# Patient Record
Sex: MALE | NOT HISPANIC OR LATINO | Employment: OTHER | URBAN - NONMETROPOLITAN AREA
[De-identification: names, ages, dates, MRNs, and addresses within clinical notes are randomized per-mention and may not be internally consistent; named-entity substitution may affect disease eponyms.]

---

## 2017-02-01 ENCOUNTER — HOSPITAL ENCOUNTER (INPATIENT)
Facility: HOSPITAL | Age: 67
LOS: 2 days | Discharge: HOME | End: 2017-02-03
Attending: INTERNAL MEDICINE | Admitting: INTERNAL MEDICINE

## 2017-02-01 PROBLEM — I21.9 AMI (ACUTE MYOCARDIAL INFARCTION) (HCC): Status: ACTIVE | Noted: 2017-02-01

## 2017-02-01 LAB
ARTICHOKE IGE QN: 186 MG/DL (ref 1–129)
CHOLEST SERPL-MCNC: 262 MG/DL (ref 0–199)
CK MB SERPL-CCNC: 48.2 NG/ML (ref 0–5)
CK MB SERPL-CCNC: 59.7 NG/ML (ref 0–5)
CK SERPL-CCNC: 1355 U/L (ref 55–170)
CK SERPL-CCNC: 2771 U/L (ref 55–170)
GLUCOSE BLDC GLUCOMTR-MCNC: 180 MG/DL (ref 70–130)
HBA1C MFR BLD: 7.61 % (ref 4–5.6)
HDLC SERPL-MCNC: 35 MG/DL (ref 60–200)
LDLC/HDLC SERPL: 4.72 {RATIO} (ref 0–3.55)
TRIGL SERPL-MCNC: 309 MG/DL (ref 20–199)
TROPONIN I SERPL-MCNC: 135 NG/ML
TROPONIN I SERPL-MCNC: 53.2 NG/ML
TROPONIN I SERPL-MCNC: 83.1 NG/ML

## 2017-02-01 PROCEDURE — C1894 INTRO/SHEATH, NON-LASER: HCPCS | Performed by: INTERNAL MEDICINE

## 2017-02-01 PROCEDURE — 25010000002 PHENYLEPHRINE PER 1 ML

## 2017-02-01 PROCEDURE — C1874 STENT, COATED/COV W/DEL SYS: HCPCS | Performed by: INTERNAL MEDICINE

## 2017-02-01 PROCEDURE — C1760 CLOSURE DEV, VASC: HCPCS | Performed by: INTERNAL MEDICINE

## 2017-02-01 PROCEDURE — C9601 PERC DRUG-EL COR STENT BRAN: HCPCS | Performed by: INTERNAL MEDICINE

## 2017-02-01 PROCEDURE — C1769 GUIDE WIRE: HCPCS | Performed by: INTERNAL MEDICINE

## 2017-02-01 PROCEDURE — 25010000002 BIVALIRUDIN PER 1 MG: Performed by: INTERNAL MEDICINE

## 2017-02-01 PROCEDURE — 25010000002 EPINEPHRINE 0.1 MG/ML SOLUTION PREFILLED SYRINGE

## 2017-02-01 PROCEDURE — 93005 ELECTROCARDIOGRAM TRACING: CPT | Performed by: INTERNAL MEDICINE

## 2017-02-01 PROCEDURE — 25010000002 MIDAZOLAM PER 1 MG: Performed by: INTERNAL MEDICINE

## 2017-02-01 PROCEDURE — B41B1ZZ FLUOROSCOPY OF OTHER INTRA-ABDOMINAL ARTERIES USING LOW OSMOLAR CONTRAST: ICD-10-PCS | Performed by: INTERNAL MEDICINE

## 2017-02-01 PROCEDURE — C1725 CATH, TRANSLUMIN NON-LASER: HCPCS | Performed by: INTERNAL MEDICINE

## 2017-02-01 PROCEDURE — 93458 L HRT ARTERY/VENTRICLE ANGIO: CPT | Performed by: INTERNAL MEDICINE

## 2017-02-01 PROCEDURE — 83036 HEMOGLOBIN GLYCOSYLATED A1C: CPT | Performed by: INTERNAL MEDICINE

## 2017-02-01 PROCEDURE — 84484 ASSAY OF TROPONIN QUANT: CPT | Performed by: INTERNAL MEDICINE

## 2017-02-01 PROCEDURE — 93005 ELECTROCARDIOGRAM TRACING: CPT

## 2017-02-01 PROCEDURE — C1887 CATHETER, GUIDING: HCPCS | Performed by: INTERNAL MEDICINE

## 2017-02-01 PROCEDURE — B2111ZZ FLUOROSCOPY OF MULTIPLE CORONARY ARTERIES USING LOW OSMOLAR CONTRAST: ICD-10-PCS | Performed by: INTERNAL MEDICINE

## 2017-02-01 PROCEDURE — 63710000001 INSULIN ASPART PER 5 UNITS: Performed by: INTERNAL MEDICINE

## 2017-02-01 PROCEDURE — C9606 PERC D-E COR REVASC W AMI S: HCPCS | Performed by: INTERNAL MEDICINE

## 2017-02-01 PROCEDURE — B41F1ZZ FLUOROSCOPY OF RIGHT LOWER EXTREMITY ARTERIES USING LOW OSMOLAR CONTRAST: ICD-10-PCS | Performed by: INTERNAL MEDICINE

## 2017-02-01 PROCEDURE — 4A023N7 MEASUREMENT OF CARDIAC SAMPLING AND PRESSURE, LEFT HEART, PERCUTANEOUS APPROACH: ICD-10-PCS | Performed by: INTERNAL MEDICINE

## 2017-02-01 PROCEDURE — 82962 GLUCOSE BLOOD TEST: CPT

## 2017-02-01 PROCEDURE — 25010000002 BIVALIRUDIN PER 1 MG

## 2017-02-01 PROCEDURE — 94799 UNLISTED PULMONARY SVC/PX: CPT

## 2017-02-01 PROCEDURE — B2151ZZ FLUOROSCOPY OF LEFT HEART USING LOW OSMOLAR CONTRAST: ICD-10-PCS | Performed by: INTERNAL MEDICINE

## 2017-02-01 PROCEDURE — 82550 ASSAY OF CK (CPK): CPT | Performed by: INTERNAL MEDICINE

## 2017-02-01 PROCEDURE — 027135Z DILATION OF CORONARY ARTERY, TWO ARTERIES WITH TWO DRUG-ELUTING INTRALUMINAL DEVICES, PERCUTANEOUS APPROACH: ICD-10-PCS | Performed by: INTERNAL MEDICINE

## 2017-02-01 PROCEDURE — 25010000002 HYDROMORPHONE PER 4 MG

## 2017-02-01 PROCEDURE — 3E073PZ INTRODUCTION OF PLATELET INHIBITOR INTO CORONARY ARTERY, PERCUTANEOUS APPROACH: ICD-10-PCS | Performed by: INTERNAL MEDICINE

## 2017-02-01 PROCEDURE — 80061 LIPID PANEL: CPT | Performed by: INTERNAL MEDICINE

## 2017-02-01 PROCEDURE — 0 IOPAMIDOL PER 1 ML: Performed by: INTERNAL MEDICINE

## 2017-02-01 PROCEDURE — 82553 CREATINE MB FRACTION: CPT | Performed by: INTERNAL MEDICINE

## 2017-02-01 PROCEDURE — 25010000002 HYDROMORPHONE PER 4 MG: Performed by: INTERNAL MEDICINE

## 2017-02-01 PROCEDURE — 93010 ELECTROCARDIOGRAM REPORT: CPT | Performed by: INTERNAL MEDICINE

## 2017-02-01 DEVICE — XIENCE ALPINE EVEROLIMUS ELUTING CORONARY STENT SYSTEM 3.00 MM X 12 MM / OVER-THE-WIRE
Type: IMPLANTABLE DEVICE | Status: FUNCTIONAL
Brand: XIENCE ALPINE

## 2017-02-01 DEVICE — XIENCE ALPINE EVEROLIMUS ELUTING CORONARY STENT SYSTEM 2.50 MM X 23 MM / OVER-THE-WIRE
Type: IMPLANTABLE DEVICE | Status: FUNCTIONAL
Brand: XIENCE ALPINE

## 2017-02-01 RX ORDER — CEFDINIR 300 MG/1
300 CAPSULE ORAL EVERY 12 HOURS
Status: DISCONTINUED | OUTPATIENT
Start: 2017-02-01 | End: 2017-02-03 | Stop reason: HOSPADM

## 2017-02-01 RX ORDER — TEMAZEPAM 7.5 MG/1
7.5 CAPSULE ORAL NIGHTLY PRN
Status: DISCONTINUED | OUTPATIENT
Start: 2017-02-01 | End: 2017-02-03 | Stop reason: HOSPADM

## 2017-02-01 RX ORDER — HYDROCHLOROTHIAZIDE 12.5 MG/1
12.5 CAPSULE, GELATIN COATED ORAL DAILY
Status: DISCONTINUED | OUTPATIENT
Start: 2017-02-01 | End: 2017-02-03 | Stop reason: HOSPADM

## 2017-02-01 RX ORDER — HYDROCODONE BITARTRATE AND ACETAMINOPHEN 7.5; 325 MG/1; MG/1
1 TABLET ORAL EVERY 4 HOURS PRN
Status: DISCONTINUED | OUTPATIENT
Start: 2017-02-01 | End: 2017-02-03 | Stop reason: HOSPADM

## 2017-02-01 RX ORDER — DEXTROSE MONOHYDRATE 25 G/50ML
25 INJECTION, SOLUTION INTRAVENOUS
Status: DISCONTINUED | OUTPATIENT
Start: 2017-02-01 | End: 2017-02-03 | Stop reason: HOSPADM

## 2017-02-01 RX ORDER — PREDNISOLONE ACETATE 10 MG/ML
1 SUSPENSION/ DROPS OPHTHALMIC EVERY 12 HOURS SCHEDULED
Status: DISCONTINUED | OUTPATIENT
Start: 2017-02-01 | End: 2017-02-03 | Stop reason: HOSPADM

## 2017-02-01 RX ORDER — DOCUSATE SODIUM 100 MG/1
100 CAPSULE, LIQUID FILLED ORAL 2 TIMES DAILY
Status: DISCONTINUED | OUTPATIENT
Start: 2017-02-01 | End: 2017-02-03 | Stop reason: HOSPADM

## 2017-02-01 RX ORDER — NITROGLYCERIN 20 MG/100ML
5-200 INJECTION INTRAVENOUS
Status: DISCONTINUED | OUTPATIENT
Start: 2017-02-01 | End: 2017-02-02

## 2017-02-01 RX ORDER — ATORVASTATIN CALCIUM 40 MG/1
40 TABLET, FILM COATED ORAL NIGHTLY
Status: DISCONTINUED | OUTPATIENT
Start: 2017-02-01 | End: 2017-02-03 | Stop reason: HOSPADM

## 2017-02-01 RX ORDER — VALSARTAN 160 MG/1
160 TABLET ORAL DAILY
Status: DISCONTINUED | OUTPATIENT
Start: 2017-02-01 | End: 2017-02-03 | Stop reason: HOSPADM

## 2017-02-01 RX ORDER — ATENOLOL 25 MG/1
25 TABLET ORAL 2 TIMES DAILY
Status: DISCONTINUED | OUTPATIENT
Start: 2017-02-01 | End: 2017-02-03 | Stop reason: HOSPADM

## 2017-02-01 RX ORDER — ONDANSETRON 2 MG/ML
4 INJECTION INTRAMUSCULAR; INTRAVENOUS EVERY 6 HOURS PRN
Status: DISCONTINUED | OUTPATIENT
Start: 2017-02-01 | End: 2017-02-03 | Stop reason: HOSPADM

## 2017-02-01 RX ORDER — TAMSULOSIN HYDROCHLORIDE 0.4 MG/1
0.4 CAPSULE ORAL DAILY
Status: DISCONTINUED | OUTPATIENT
Start: 2017-02-01 | End: 2017-02-03 | Stop reason: HOSPADM

## 2017-02-01 RX ORDER — SODIUM CHLORIDE 450 MG/100ML
75 INJECTION, SOLUTION INTRAVENOUS CONTINUOUS
Status: DISCONTINUED | OUTPATIENT
Start: 2017-02-01 | End: 2017-02-02

## 2017-02-01 RX ORDER — ASPIRIN 81 MG/1
81 TABLET, CHEWABLE ORAL DAILY
Status: DISCONTINUED | OUTPATIENT
Start: 2017-02-01 | End: 2017-02-03 | Stop reason: HOSPADM

## 2017-02-01 RX ORDER — GENTAMICIN SULFATE 3 MG/ML
1 SOLUTION/ DROPS OPHTHALMIC 3 TIMES DAILY
Status: DISCONTINUED | OUTPATIENT
Start: 2017-02-01 | End: 2017-02-03 | Stop reason: HOSPADM

## 2017-02-01 RX ORDER — MIDAZOLAM HYDROCHLORIDE 1 MG/ML
INJECTION INTRAMUSCULAR; INTRAVENOUS AS NEEDED
Status: DISCONTINUED | OUTPATIENT
Start: 2017-02-01 | End: 2017-02-01 | Stop reason: HOSPADM

## 2017-02-01 RX ORDER — HYDROCODONE BITARTRATE AND ACETAMINOPHEN 10; 325 MG/1; MG/1
1 TABLET ORAL EVERY 4 HOURS PRN
Status: DISCONTINUED | OUTPATIENT
Start: 2017-02-01 | End: 2017-02-03 | Stop reason: HOSPADM

## 2017-02-01 RX ORDER — NITROGLYCERIN 0.4 MG/1
TABLET SUBLINGUAL AS NEEDED
Status: DISCONTINUED | OUTPATIENT
Start: 2017-02-01 | End: 2017-02-01 | Stop reason: HOSPADM

## 2017-02-01 RX ORDER — SODIUM CHLORIDE 0.9 % (FLUSH) 0.9 %
1-10 SYRINGE (ML) INJECTION AS NEEDED
Status: DISCONTINUED | OUTPATIENT
Start: 2017-02-01 | End: 2017-02-03 | Stop reason: HOSPADM

## 2017-02-01 RX ORDER — ONDANSETRON 4 MG/1
4 TABLET, FILM COATED ORAL EVERY 6 HOURS PRN
Status: DISCONTINUED | OUTPATIENT
Start: 2017-02-01 | End: 2017-02-03 | Stop reason: HOSPADM

## 2017-02-01 RX ORDER — LIDOCAINE HYDROCHLORIDE 20 MG/ML
INJECTION, SOLUTION INFILTRATION; PERINEURAL AS NEEDED
Status: DISCONTINUED | OUTPATIENT
Start: 2017-02-01 | End: 2017-02-01 | Stop reason: HOSPADM

## 2017-02-01 RX ORDER — NICOTINE POLACRILEX 4 MG
15 LOZENGE BUCCAL
Status: DISCONTINUED | OUTPATIENT
Start: 2017-02-01 | End: 2017-02-03 | Stop reason: HOSPADM

## 2017-02-01 RX ORDER — ACETAMINOPHEN 325 MG/1
650 TABLET ORAL EVERY 4 HOURS PRN
Status: DISCONTINUED | OUTPATIENT
Start: 2017-02-01 | End: 2017-02-03 | Stop reason: HOSPADM

## 2017-02-01 RX ORDER — ONDANSETRON 4 MG/1
4 TABLET, ORALLY DISINTEGRATING ORAL EVERY 6 HOURS PRN
Status: DISCONTINUED | OUTPATIENT
Start: 2017-02-01 | End: 2017-02-03 | Stop reason: HOSPADM

## 2017-02-01 RX ADMIN — NITROGLYCERIN 50 MCG/MIN: 20 INJECTION INTRAVENOUS at 20:46

## 2017-02-01 RX ADMIN — VALSARTAN 160 MG: 160 TABLET, FILM COATED ORAL at 12:41

## 2017-02-01 RX ADMIN — HYDROMORPHONE HYDROCHLORIDE 0.2 MG: 1 INJECTION, SOLUTION INTRAMUSCULAR; INTRAVENOUS; SUBCUTANEOUS at 16:10

## 2017-02-01 RX ADMIN — CEFDINIR 300 MG: 300 CAPSULE ORAL at 21:00

## 2017-02-01 RX ADMIN — ATORVASTATIN CALCIUM 40 MG: 40 TABLET, FILM COATED ORAL at 20:59

## 2017-02-01 RX ADMIN — DOCUSATE SODIUM 100 MG: 100 CAPSULE, LIQUID FILLED ORAL at 17:28

## 2017-02-01 RX ADMIN — TEMAZEPAM 7.5 MG: 7.5 CAPSULE ORAL at 23:50

## 2017-02-01 RX ADMIN — DOCUSATE SODIUM 100 MG: 100 CAPSULE, LIQUID FILLED ORAL at 11:06

## 2017-02-01 RX ADMIN — HYDROMORPHONE HYDROCHLORIDE 0.2 MG: 1 INJECTION, SOLUTION INTRAMUSCULAR; INTRAVENOUS; SUBCUTANEOUS at 12:20

## 2017-02-01 RX ADMIN — ATENOLOL 25 MG: 25 TABLET ORAL at 17:28

## 2017-02-01 RX ADMIN — SODIUM CHLORIDE 75 ML/HR: 4.5 INJECTION, SOLUTION INTRAVENOUS at 23:11

## 2017-02-01 RX ADMIN — SODIUM CHLORIDE 75 ML/HR: 4.5 INJECTION, SOLUTION INTRAVENOUS at 11:08

## 2017-02-01 RX ADMIN — SITAGLIPTIN 100 MG: 100 TABLET, FILM COATED ORAL at 12:41

## 2017-02-01 RX ADMIN — CEFDINIR 300 MG: 300 CAPSULE ORAL at 12:32

## 2017-02-01 RX ADMIN — TICAGRELOR 90 MG: 90 TABLET ORAL at 17:28

## 2017-02-01 RX ADMIN — HYDROCODONE BITARTRATE AND ACETAMINOPHEN 1 TABLET: 7.5; 325 TABLET ORAL at 14:49

## 2017-02-01 RX ADMIN — HYDROCODONE BITARTRATE AND ACETAMINOPHEN 1 TABLET: 7.5; 325 TABLET ORAL at 11:07

## 2017-02-01 RX ADMIN — TAMSULOSIN HYDROCHLORIDE 0.4 MG: 0.4 CAPSULE ORAL at 12:42

## 2017-02-01 RX ADMIN — CEFDINIR 300 MG: 300 CAPSULE ORAL at 22:40

## 2017-02-01 RX ADMIN — HYDROCHLOROTHIAZIDE 12.5 MG: 12.5 CAPSULE ORAL at 11:07

## 2017-02-01 RX ADMIN — HYDROCODONE BITARTRATE AND ACETAMINOPHEN 1 TABLET: 7.5; 325 TABLET ORAL at 20:59

## 2017-02-01 RX ADMIN — INSULIN ASPART 2 UNITS: 100 INJECTION, SOLUTION INTRAVENOUS; SUBCUTANEOUS at 21:00

## 2017-02-01 RX ADMIN — ATENOLOL 25 MG: 25 TABLET ORAL at 11:07

## 2017-02-01 NOTE — H&P
PRIMARY CARE PHYSICIAN:  Willis Green MD     REASON FOR ADMISSION:  Chest pain, acute lateral wall myocardial infarction.      HISTORY OF PRESENT ILLNESS:  This is a 66-year-old morbidly obese  male, with a past medical history significant for nonobstructive atherosclerotic coronary artery disease on previous coronary angiogram done at [facility], history of arterial hypertension, hypertensive heart disease, hyperlipidemia, male hypogonadism, and type 2 diabetes, currently on testosterone supplement with family history for coronary artery disease, who presented to [facility] with symptoms of chest pain.  Patient apparently was experiencing symptoms of substernal chest pain yesterday along with shoulder discomfort.  Patient had not sought any medical attention yesterday.  Patient’s shoulder pain had subsided and earlier this morning, patient started having crushing substernal chest pain associated with shortness of breath and diaphoresis.     Due to the patient’s symptom complex, the patient subsequently presented to [facility] Emergency Room.  Patient’s initial electrocardiogram at [facility] had revealed ST segment elevation in lead 1 and aVL suggestive of acute lateral wall myocardial infarction.  Due to the patient’s acute lateral wall myocardial infarction, we had received a phone call from the  requesting the patient to be transferred to [facility] for further evaluation.  Patient has had followup evaluation with Dr. Willis Green and with Dr. Rojo from Endocrinology for the diabetes and the hypogonadism management.      Patient was transferred from [facility] by helicopter to [facility].  Patient, on arrival, continued to have persistent chest discomfort.  Due to the patient's electrocardiographic changes suggestive of lateral wall myocardial infarction  with ongoing symptoms of chest pain, patient was recommended an emergent coronary angiogram, and rescue PTCA of the infarct related vessel.  Patient was explained the risks, benefits and treatment options, and an informed consent was obtained from the patient for the coronary angiogram.  Patient remained hemodynamically stable and patient was recommended to proceed with the coronary angiogram.  Patient’s Mallampati score is #2.  Patient’s ASA classification is #2.  The risks of minimal sedation were also discussed with the patient.      REVIEW OF SYSTEMS:  Patient’s 10-point review of systems except for what is stated in the History of Present Illness is negative.      ALLERGIES:   1.  PREVACID.  2.  ALBUTEROL.     ADMISSION MEDICATIONS:    1.  Atenolol 25 mg tablet twice daily.   2.  Omnicef 300 mg tablet q.12 h.   3.  Zetia 10 mg daily.   4.  Metformin 500 mg twice daily.   5.  Januvia 100 mg daily.   6.  Flomax 0.4 mg daily.  7.  Testosterone 100 mg/mL injection every 10 days.    8.  Diovan/hydrochlorothiazide 160/12.5 mg daily.   9.  Vitamin D 50,000 units every 14 days.  10.  Prednisone drops as directed.      PAST MEDICAL HISTORY:  1.  Chest pain.  2.  Abnormal resting electrocardiogram suggestive of acute lateral wall myocardial infarction.  3.  Nonobstructive atherosclerotic coronary artery disease on previous coronary angiogram done at [facility].    4.  Arterial hypertension.  5.  Hypertensive heart disease.   6.  Hyperlipidemia.  7.  Noninsulin dependent diabetes.   8.  Vitamin D deficiency.   9.  Male hypogonadism.     10.  Benign prostatic hypertrophy.    11.  Obesity.   12.  Anxiety.      PAST SURGICAL HISTORY:  Patient had a remote history of trauma to the ankle with surgical intervention.  No recent surgery.      FAMILY HISTORY:   Premature atherosclerotic coronary artery disease in parents.     SOCIAL HISTORY:  Denies any tobacco or alcohol intake.      Patient has been followed by  Dr. Willis Green in [place].      CARDIAC RISK FACTORS:  1.  Male.  2.  Arterial hypertension.  3.  Hyperlipidemia.  4.  Diabetes.  5.  Obesity.  6.  Family history for coronary artery disease.     PHYSICAL EXAMINATION:    GENERAL:  The patient is a well-developed, well-nourished individual currently complaining of severe substernal chest discomfort.  VITAL SIGNS:  Blood pressure 140/90, pulse 70 regular, respirations 18, afebrile.    HEENT:  Atraumatic, normocephalic.  Pupils are equally reacting to light.  Extraocular movements intact.  Pink conjunctivae.  Anicteric sclerae.  Moist mucous membranes.  Throat is clear.   NECK:  There is no jugular venous distention.  Thyroid is not palpable.   LUNGS:  Clear.   CARDIAC:  Regular S1 and S2.  Hollow systolic murmur, best heard at the apex.  No appreciable click.  EXTREMITIES:  There is no edema.    ABDOMEN:  Soft, nontender.  Liver and spleen are not palpable.    NEUROLOGIC:  Patient is awake, alert and oriented x3.  No focal changes.  Cranial nerves II-XII are grossly intact.  Motor, power, and tone in the upper and lower extremities are grossly intact.      DIAGNOSTIC DATA:  Labs from [facility] was reviewed.  Resting electrocardiogram done revealed sinus rhythm with ST segment elevation in leads 1 and aVL suggestive of an acute lateral wall myocardial infarction injury current pattern.      ASSESSMENT AND PLAN:  1.  Chest pain with abnormal resting electrocardiogram with acute lateral wall myocardial infarction.  Patient had symptoms of chest pain last night and today pain worsened and he presented to [facility].  Due to the patient’s electrocardiographic changes suggestive of lateral wall myocardial infarction with ongoing symptoms of chest pain, patient was recommended an emergent coronary angiogram and rescue PTCA.  The risks, benefits and treatment options for the coronary angiogram were discussed with the patient and an  informed consent was obtained from the patient for the coronary angiogram.  Patient’s Mallampati score is #2.  Patient’s ASA classification is #2.  The risk for minimal sedation was also discussed with the patient and an informed consent was obtained from the patient for the minimal sedation.  The plan is to proceed with a left heart catheterization and rescue PTCA.    2.  Arterial hypertension.  Patient’s blood pressure has been labile.  Patient will be continued on the present dose of atenolol and Diovan.    3.  Hyperlipidemia.  Patient has had elevated triglyceride level.  Patient has been followed by Dr. George Rojo.  Patient is currently on Zetia.  Patient will be started on Lipitor after the coronary angiogram.    4.  Vitamin D deficiency.  Patient is on vitamin D supplement, which will be continued.    5.  Benign prostatic hypertrophy.  Patient has been on Flomax, and has been followed by Dr. Willis Green.   6.  Noninsulin dependent diabetes.  Patient has been counseled on American Diabetic Association Diet, and patient will be continued on Januvia.  Patient’s blood sugar will be followed by the Hospitalist during the hospitalization.     7.  Male hypogonadism.  Patient is on testosterone supplement and has been followed by Dr. Rojo.      Further recommendations to follow after the coronary angiogram.          CC: MD Lopez Martini MD Deepak N. Kapadia, MD  Dictation Date/Time:      02/01/2017 14:35:00  (Eastern Time Zone)  Transcribed Date/Time: 02/01/2017 15:23:18 (Eastern Time Zone)  Dictator/ Initials:   JERRY/minal  Document ID:                [id number]  Job ID: [id number]

## 2017-02-02 LAB
ANION GAP SERPL CALCULATED.3IONS-SCNC: 12 MMOL/L (ref 5–15)
ARTICHOKE IGE QN: 135 MG/DL (ref 1–129)
BUN BLD-MCNC: 18 MG/DL (ref 7–21)
BUN/CREAT SERPL: 15.7 (ref 7–25)
CALCIUM SPEC-SCNC: 9.2 MG/DL (ref 8.4–10.2)
CHLORIDE SERPL-SCNC: 97 MMOL/L (ref 95–110)
CHOLEST SERPL-MCNC: 215 MG/DL (ref 0–199)
CK MB SERPL-CCNC: 23.2 NG/ML (ref 0–5)
CK SERPL-CCNC: 900 U/L (ref 55–170)
CO2 SERPL-SCNC: 25 MMOL/L (ref 22–31)
CREAT BLD-MCNC: 1.15 MG/DL (ref 0.7–1.3)
DEPRECATED RDW RBC AUTO: 44.5 FL (ref 35.1–43.9)
ERYTHROCYTE [DISTWIDTH] IN BLOOD BY AUTOMATED COUNT: 13.9 % (ref 11.5–14.5)
GFR SERPL CREATININE-BSD FRML MDRD: 64 ML/MIN/1.73 (ref 49–113)
GFR SERPL CREATININE-BSD FRML MDRD: 77 ML/MIN/1.73 (ref 49–113)
GLUCOSE BLD-MCNC: 178 MG/DL (ref 60–100)
GLUCOSE BLDC GLUCOMTR-MCNC: 148 MG/DL (ref 70–130)
GLUCOSE BLDC GLUCOMTR-MCNC: 173 MG/DL (ref 70–130)
GLUCOSE BLDC GLUCOMTR-MCNC: 179 MG/DL (ref 70–130)
GLUCOSE BLDC GLUCOMTR-MCNC: 184 MG/DL (ref 70–130)
HCT VFR BLD AUTO: 44.3 % (ref 39–49)
HDLC SERPL-MCNC: 33 MG/DL (ref 60–200)
HGB BLD-MCNC: 15.1 G/DL (ref 13.7–17.3)
LDLC/HDLC SERPL: 3.72 {RATIO} (ref 0–3.55)
MCH RBC QN AUTO: 29.7 PG (ref 26.5–34)
MCHC RBC AUTO-ENTMCNC: 34.1 G/DL (ref 31.5–36.3)
MCV RBC AUTO: 87 FL (ref 80–98)
PLATELET # BLD AUTO: 210 10*3/MM3 (ref 150–450)
PMV BLD AUTO: 9.4 FL (ref 8–12)
POTASSIUM BLD-SCNC: 4.1 MMOL/L (ref 3.5–5.1)
RBC # BLD AUTO: 5.09 10*6/MM3 (ref 4.37–5.74)
SODIUM BLD-SCNC: 134 MMOL/L (ref 137–145)
TRIGL SERPL-MCNC: 297 MG/DL (ref 20–199)
TROPONIN I SERPL-MCNC: 47.3 NG/ML
TROPONIN I SERPL-MCNC: 76.9 NG/ML
WBC NRBC COR # BLD: 12.25 10*3/MM3 (ref 3.2–9.8)

## 2017-02-02 PROCEDURE — 93010 ELECTROCARDIOGRAM REPORT: CPT | Performed by: INTERNAL MEDICINE

## 2017-02-02 PROCEDURE — 82550 ASSAY OF CK (CPK): CPT | Performed by: INTERNAL MEDICINE

## 2017-02-02 PROCEDURE — 63710000001 INSULIN ASPART PER 5 UNITS: Performed by: INTERNAL MEDICINE

## 2017-02-02 PROCEDURE — 82553 CREATINE MB FRACTION: CPT | Performed by: INTERNAL MEDICINE

## 2017-02-02 PROCEDURE — 82962 GLUCOSE BLOOD TEST: CPT

## 2017-02-02 PROCEDURE — 94799 UNLISTED PULMONARY SVC/PX: CPT

## 2017-02-02 PROCEDURE — 84484 ASSAY OF TROPONIN QUANT: CPT | Performed by: INTERNAL MEDICINE

## 2017-02-02 PROCEDURE — 85027 COMPLETE CBC AUTOMATED: CPT | Performed by: INTERNAL MEDICINE

## 2017-02-02 PROCEDURE — 80061 LIPID PANEL: CPT | Performed by: INTERNAL MEDICINE

## 2017-02-02 PROCEDURE — 80048 BASIC METABOLIC PNL TOTAL CA: CPT | Performed by: INTERNAL MEDICINE

## 2017-02-02 PROCEDURE — 93005 ELECTROCARDIOGRAM TRACING: CPT | Performed by: INTERNAL MEDICINE

## 2017-02-02 RX ADMIN — VALSARTAN 160 MG: 160 TABLET, FILM COATED ORAL at 09:28

## 2017-02-02 RX ADMIN — INSULIN ASPART 2 UNITS: 100 INJECTION, SOLUTION INTRAVENOUS; SUBCUTANEOUS at 11:37

## 2017-02-02 RX ADMIN — TICAGRELOR 90 MG: 90 TABLET ORAL at 18:29

## 2017-02-02 RX ADMIN — PREDNISOLONE ACETATE 1 DROP: 10 SUSPENSION/ DROPS OPHTHALMIC at 21:25

## 2017-02-02 RX ADMIN — ATORVASTATIN CALCIUM 40 MG: 40 TABLET, FILM COATED ORAL at 21:25

## 2017-02-02 RX ADMIN — ATENOLOL 25 MG: 25 TABLET ORAL at 18:29

## 2017-02-02 RX ADMIN — Medication 10 ML: at 09:29

## 2017-02-02 RX ADMIN — ATENOLOL 25 MG: 25 TABLET ORAL at 09:28

## 2017-02-02 RX ADMIN — INSULIN ASPART 2 UNITS: 100 INJECTION, SOLUTION INTRAVENOUS; SUBCUTANEOUS at 21:25

## 2017-02-02 RX ADMIN — GENTAMICIN SULFATE 1 DROP: 3 SOLUTION/ DROPS OPHTHALMIC at 21:25

## 2017-02-02 RX ADMIN — CEFDINIR 300 MG: 300 CAPSULE ORAL at 11:34

## 2017-02-02 RX ADMIN — TICAGRELOR 90 MG: 90 TABLET ORAL at 09:28

## 2017-02-02 RX ADMIN — HYDROCHLOROTHIAZIDE 12.5 MG: 12.5 CAPSULE ORAL at 09:28

## 2017-02-02 RX ADMIN — DOCUSATE SODIUM 100 MG: 100 CAPSULE, LIQUID FILLED ORAL at 18:33

## 2017-02-02 RX ADMIN — SODIUM CHLORIDE 75 ML/HR: 4.5 INJECTION, SOLUTION INTRAVENOUS at 12:39

## 2017-02-02 RX ADMIN — SITAGLIPTIN 100 MG: 100 TABLET, FILM COATED ORAL at 09:28

## 2017-02-02 RX ADMIN — CEFDINIR 300 MG: 300 CAPSULE ORAL at 23:28

## 2017-02-02 RX ADMIN — ASPIRIN 81 MG 81 MG: 81 TABLET ORAL at 09:28

## 2017-02-02 RX ADMIN — PREDNISOLONE ACETATE 1 DROP: 10 SUSPENSION/ DROPS OPHTHALMIC at 12:41

## 2017-02-02 RX ADMIN — INSULIN ASPART 2 UNITS: 100 INJECTION, SOLUTION INTRAVENOUS; SUBCUTANEOUS at 06:34

## 2017-02-02 RX ADMIN — TAMSULOSIN HYDROCHLORIDE 0.4 MG: 0.4 CAPSULE ORAL at 09:28

## 2017-02-02 NOTE — PAYOR COMM NOTE
"Erick Antonio (66 y.o. Male)     Date of Birth Social Security Number Address Home Phone MRN    [id number]  [id number] [facility] [place] [address] [phone] [id number]    [employer] Marital Status          Islam        Admission Date Admission Type Admitting Provider Attending Provider Department, Room/Bed    2/1/17 Urgent Jorge Alvarado MD Kapadia, Deepak, MD [facility] CRITICAL CARE, 06/A    Discharge Date Discharge Disposition Discharge Destination                      Attending Provider: Jorge Alvarado MD     Allergies:  Albuterol, Prevacid [Lansoprazole]    Isolation:  None   Infection:  None   Code Status:  FULL    Ht:  70\" (177.8 cm)   Wt:  171 lb 1.2 oz (77.6 kg)    Admission Cmt:  None   Principal Problem:  None                Active Insurance as of 2/1/2017     Primary Coverage     Payor Plan Insurance Group Employer/Plan Group    CIGCINDY TONY [id number]     Payor Plan Address Payor Plan Phone Number Effective From Effective To    [address] [phone] 9/1/2015     [place] [id number]       Subscriber Name Subscriber Birth Date Member ID       ORI ANTONIO E 1950 [id number]           Secondary Coverage     Payor Plan Insurance Group Employer/Plan Group    MEDICARE MEDICARE A ONLY      Payor Plan Address Payor Plan Phone Number Effective From Effective To    [address] [phone] 10/1/2016     [place] [address]       Subscriber Name Subscriber Birth Date Member ID       ERICK ANTONIO 1950 [id number]                 Emergency Contacts      (Rel.) Home Phone Work Phone Mobile Phone    Ori Antonio (Spouse) [phone] -- --               History & Physical      H&P signed by Jorge Alvarado MD at 2/2/2017  1:42 AM              PRIMARY CARE PHYSICIAN:  Willis Green MD     REASON FOR ADMISSION:  Chest pain, acute lateral wall myocardial infarction.      HISTORY OF PRESENT ILLNESS:  This is a 66-year-old morbidly obese "  male, with a past medical history significant for nonobstructive atherosclerotic coronary artery disease on previous coronary angiogram done at [facility], history of arterial hypertension, hypertensive heart disease, hyperlipidemia, male hypogonadism, and type 2 diabetes, currently on testosterone supplement with family history for coronary artery disease, who presented to [facility] with symptoms of chest pain.  Patient apparently was experiencing symptoms of substernal chest pain yesterday along with shoulder discomfort.  Patient had not sought any medical attention yesterday.  Patient’s shoulder pain had subsided and earlier this morning, patient started having crushing substernal chest pain associated with shortness of breath and diaphoresis.     Due to the patient’s symptom complex, the patient subsequently presented to [facility] Emergency Room.  Patient’s initial electrocardiogram at [facility] had revealed ST segment elevation in lead 1 and aVL suggestive of acute lateral wall myocardial infarction.  Due to the patient’s acute lateral wall myocardial infarction, we had received a phone call from the  requesting the patient to be transferred to [facility] for further evaluation.  Patient has had followup evaluation with Dr. Willis Green and with Dr. Rojo from Endocrinology for the diabetes and the hypogonadism management.      Patient was transferred from [facility] by helicopter to [facility].  Patient, on arrival, continued to have persistent chest discomfort.  Due to the patient's electrocardiographic changes suggestive of lateral wall myocardial infarction with ongoing symptoms of chest pain, patient was recommended an emergent coronary angiogram, and rescue PTCA of the infarct related vessel.  Patient was explained the risks, benefits and treatment  options, and an informed consent was obtained from the patient for the coronary angiogram.  Patient remained hemodynamically stable and patient was recommended to proceed with the coronary angiogram.  Patient’s Mallampati score is #2.  Patient’s ASA classification is #2.  The risks of minimal sedation were also discussed with the patient.      REVIEW OF SYSTEMS:  Patient’s 10-point review of systems except for what is stated in the History of Present Illness is negative.      ALLERGIES:   1.  PREVACID.  2.  ALBUTEROL.     ADMISSION MEDICATIONS:    1.  Atenolol 25 mg tablet twice daily.   2.  Omnicef 300 mg tablet q.12 h.   3.  Zetia 10 mg daily.   4.  Metformin 500 mg twice daily.   5.  Januvia 100 mg daily.   6.  Flomax 0.4 mg daily.  7.  Testosterone 100 mg/mL injection every 10 days.    8.  Diovan/hydrochlorothiazide 160/12.5 mg daily.   9.  Vitamin D 50,000 units every 14 days.  10.  Prednisone drops as directed.      PAST MEDICAL HISTORY:  1.  Chest pain.  2.  Abnormal resting electrocardiogram suggestive of acute lateral wall myocardial infarction.  3.  Nonobstructive atherosclerotic coronary artery disease on previous coronary angiogram done at [facility].    4.  Arterial hypertension.  5.  Hypertensive heart disease.   6.  Hyperlipidemia.  7.  Noninsulin dependent diabetes.   8.  Vitamin D deficiency.   9.  Male hypogonadism.     10.  Benign prostatic hypertrophy.    11.  Obesity.   12.  Anxiety.      PAST SURGICAL HISTORY:  Patient had a remote history of trauma to the ankle with surgical intervention.  No recent surgery.      FAMILY HISTORY:   Premature atherosclerotic coronary artery disease in parents.     SOCIAL HISTORY:  Denies any tobacco or alcohol intake.      Patient has been followed by Dr. Willis Green in [place].      CARDIAC RISK FACTORS:  1.  Male.  2.  Arterial hypertension.  3.  Hyperlipidemia.  4.  Diabetes.  5.  Obesity.  6.  Family history for coronary artery  disease.     PHYSICAL EXAMINATION:    GENERAL:  The patient is a well-developed, well-nourished individual currently complaining of severe substernal chest discomfort.  VITAL SIGNS:  Blood pressure 140/90, pulse 70 regular, respirations 18, afebrile.    HEENT:  Atraumatic, normocephalic.  Pupils are equally reacting to light.  Extraocular movements intact.  Pink conjunctivae.  Anicteric sclerae.  Moist mucous membranes.  Throat is clear.   NECK:  There is no jugular venous distention.  Thyroid is not palpable.   LUNGS:  Clear.   CARDIAC:  Regular S1 and S2.  Hollow systolic murmur, best heard at the apex.  No appreciable click.  EXTREMITIES:  There is no edema.    ABDOMEN:  Soft, nontender.  Liver and spleen are not palpable.    NEUROLOGIC:  Patient is awake, alert and oriented x3.  No focal changes.  Cranial nerves II-XII are grossly intact.  Motor, power, and tone in the upper and lower extremities are grossly intact.      DIAGNOSTIC DATA:  Labs from [facility] was reviewed.  Resting electrocardiogram done revealed sinus rhythm with ST segment elevation in leads 1 and aVL suggestive of an acute lateral wall myocardial infarction injury current pattern.      ASSESSMENT AND PLAN:  1.  Chest pain with abnormal resting electrocardiogram with acute lateral wall myocardial infarction.  Patient had symptoms of chest pain last night and today pain worsened and he presented to [facility].  Due to the patient’s electrocardiographic changes suggestive of lateral wall myocardial infarction with ongoing symptoms of chest pain, patient was recommended an emergent coronary angiogram and rescue PTCA.  The risks, benefits and treatment options for the coronary angiogram were discussed with the patient and an informed consent was obtained from the patient for the coronary angiogram.  Patient’s Mallampati score is #2.  Patient’s ASA classification is #2.  The risk for minimal sedation was also  discussed with the patient and an informed consent was obtained from the patient for the minimal sedation.  The plan is to proceed with a left heart catheterization and rescue PTCA.    2.  Arterial hypertension.  Patient’s blood pressure has been labile.  Patient will be continued on the present dose of atenolol and Diovan.    3.  Hyperlipidemia.  Patient has had elevated triglyceride level.  Patient has been followed by Dr. George Rojo.  Patient is currently on Zetia.  Patient will be started on Lipitor after the coronary angiogram.    4.  Vitamin D deficiency.  Patient is on vitamin D supplement, which will be continued.    5.  Benign prostatic hypertrophy.  Patient has been on Flomax, and has been followed by Dr. Willis Green.   6.  Noninsulin dependent diabetes.  Patient has been counseled on American Diabetic Association Diet, and patient will be continued on Januvia.  Patient’s blood sugar will be followed by the Hospitalist during the hospitalization.     7.  Male hypogonadism.  Patient is on testosterone supplement and has been followed by Dr. Rojo.      Further recommendations to follow after the coronary angiogram.          CC: MD Lopez Martini MD Deepak N. Kapadia, MD  Dictation Date/Time:      02/01/2017 14:35:00  (Eastern Time Zone)  Transcribed Date/Time: 02/01/2017 15:23:18 (Eastern Time Zone)  Dictator/ Initials:   JERRY/minal  Document ID:                [id number]  Job ID: [id number]       Electronically signed by Jorge Alvarado MD at 2/2/2017  1:42 AM        Vital Signs (last 24 hours)       02/01 0700  -  02/02 0659 02/02 0700  -  02/02 1325   Most Recent    Temp (°F) 97.6 -  98.1    96.7 -  98.2     98.2 (36.8)    Heart Rate 55 -  85    67 -  80     80    Resp 18 -  22    16 -  22     22    /67 -  160/81    117/69 -  169/99     169/99    SpO2 (%) 93 -  99    96 -  98     97          Lines, Drains & Airways    Active LDAs     Name:    Placement date:   Placement time:   Site:   Days:    Peripheral IV Line - Single Lumen 02/01/17 0744 basilic vein (medial side of arm), left 20 gauge  02/01/17 0744      1    Peripheral IV Line - Single Lumen 02/01/17 0744 median cubital vein (antecubital fossa), right 18 gauge  02/01/17 0744      1         Inactive LDAs     Name:   Placement date:   Placement time:   Removal date:   Removal time:   Site:   Days:    [REMOVED] Arterial Sheath 6 Fr. Right Femoral  02/01/17    0753    02/01/17    0859    Femoral    less than 1                Hospital Medications (all)       Dose Frequency Start End    acetaminophen (TYLENOL) tablet 650 mg 650 mg Every 4 Hours PRN 2/1/2017     Sig - Route: Take 2 tablets by mouth Every 4 (Four) Hours As Needed for mild pain (1-3) or fever (temperature greater than 101F). - Oral    aspirin chewable tablet 81 mg 81 mg Daily 2/1/2017     Sig - Route: Chew 1 tablet Daily. - Oral    atenolol (TENORMIN) tablet 25 mg 25 mg 2 Times Daily 2/1/2017     Sig - Route: Take 1 tablet by mouth 2 (Two) Times a Day. - Oral    atorvastatin (LIPITOR) tablet 40 mg 40 mg Nightly 2/1/2017     Sig - Route: Take 1 tablet by mouth Every Night. - Oral    cefdinir (OMNICEF) capsule 300 mg 300 mg Every 12 Hours 2/1/2017     Sig - Route: Take 1 capsule by mouth Every 12 (Twelve) Hours. - Oral    dextrose (D50W) solution 25 g 25 g Every 15 Minutes PRN 2/1/2017     Sig - Route: Infuse 50 mL into a venous catheter Every 15 (Fifteen) Minutes As Needed for low blood sugar (Blood Sugar Less Than 70, Patient Has IV Access - Unresponsive, NPO or Unable To Safely Swallow). - Intravenous    dextrose (GLUTOSE) oral gel 15 g 15 g Every 15 Minutes PRN 2/1/2017     Sig - Route: Take 15 g by mouth Every 15 (Fifteen) Minutes As Needed for low blood sugar (Blood Sugar Less Than 70, Patient Alert, Is Not NPO & Can Safely Swallow). - Oral    docusate sodium (COLACE) capsule 100 mg 100 mg 2 Times Daily 2/1/2017     Sig - Route:  "Take 1 capsule by mouth 2 (Two) Times a Day. - Oral    gentamicin (GARAMYCIN) 0.3 % ophthalmic solution 1 drop 1 drop 3 Times Daily 2/1/2017     Sig - Route: Administer 1 drop to both eyes 3 (Three) Times a Day. - Both Eyes    glucagon (human recombinant) (GLUCAGEN DIAGNOSTIC) injection 1 mg 1 mg Every 15 Minutes PRN 2/1/2017     Sig - Route: Inject 1 mg under the skin Every 15 (Fifteen) Minutes As Needed (Blood Glucose Less Than 70 - Patient Without IV Access - Unresponsive, NPO or Unable To Safely Swallow). - Subcutaneous    hydrochlorothiazide (MICROZIDE) capsule 12.5 mg 12.5 mg Daily 2/1/2017     Sig - Route: Take 1 capsule by mouth Daily. - Oral    Cosign for Ordering: Required by Jorge Alvarado MD    Linked Group 1:  \"And\" Linked Group Details        HYDROcodone-acetaminophen (NORCO)  MG per tablet 1 tablet 1 tablet Every 4 Hours PRN 2/1/2017 2/11/2017    Sig - Route: Take 1 tablet by mouth Every 4 (Four) Hours As Needed for severe pain (7-10). - Oral    HYDROcodone-acetaminophen (NORCO) 7.5-325 MG per tablet 1 tablet 1 tablet Every 4 Hours PRN 2/1/2017 2/11/2017    Sig - Route: Take 1 tablet by mouth Every 4 (Four) Hours As Needed for moderate pain (4-6). - Oral    HYDROmorphone (DILAUDID) injection 0.2 mg 0.2 mg Every 4 Hours PRN 2/1/2017 2/11/2017    Sig - Route: Infuse 0.2 mg into a venous catheter Every 4 (Four) Hours As Needed for moderate pain (4-6) or severe pain (7-10). - Intravenous    insulin aspart (novoLOG) injection 0-7 Units 0-7 Units 4 Times Daily Before Meals & Nightly 2/1/2017     Sig - Route: Inject 0-7 Units under the skin 4 (Four) Times a Day Before Meals & at Bedtime. - Subcutaneous    magnesium hydroxide (MILK OF MAGNESIA) suspension 2400 mg/10mL 10 mL 10 mL Daily PRN 2/1/2017     Sig - Route: Take 10 mL by mouth Daily As Needed for constipation. - Oral    nitroglycerin infusion 200 mcg/mL 5-200 mcg/min Titrated 2/1/2017     Sig - Route: Infuse 5-200 mcg/min into a venous catheter " "Dose Adjusted By Provider As Needed. - Intravenous    ondansetron (ZOFRAN) injection 4 mg 4 mg Every 6 Hours PRN 2/1/2017     Sig - Route: Infuse 2 mL into a venous catheter Every 6 (Six) Hours As Needed for nausea or vomiting. - Intravenous    Linked Group 2:  \"Or\" Linked Group Details        ondansetron (ZOFRAN) tablet 4 mg 4 mg Every 6 Hours PRN 2/1/2017     Sig - Route: Take 1 tablet by mouth Every 6 (Six) Hours As Needed for nausea or vomiting. - Oral    Linked Group 2:  \"Or\" Linked Group Details        ondansetron ODT (ZOFRAN-ODT) disintegrating tablet 4 mg 4 mg Every 6 Hours PRN 2/1/2017     Sig - Route: Take 1 tablet by mouth Every 6 (Six) Hours As Needed for nausea or vomiting. - Oral    Linked Group 2:  \"Or\" Linked Group Details        prednisoLONE acetate (PRED FORTE) 1 % ophthalmic suspension 1 drop 1 drop Every 12 Hours Scheduled 2/1/2017     Sig - Route: Administer 1 drop to both eyes Every 12 (Twelve) Hours. - Both Eyes    SITagliptin (JANUVIA) tablet 100 mg 100 mg Daily 2/1/2017     Sig - Route: Take 1 tablet by mouth Daily. - Oral    sodium chloride 0.45 % infusion 75 mL/hr Continuous 2/1/2017     Sig - Route: Infuse 75 mL/hr into a venous catheter Continuous. - Intravenous    sodium chloride 0.9 % flush 1-10 mL 1-10 mL As Needed 2/1/2017     Sig - Route: Infuse 1-10 mL into a venous catheter As Needed for line care. - Intravenous    tamsulosin (FLOMAX) 24 hr capsule 0.4 mg 0.4 mg Daily 2/1/2017     Sig - Route: Take 1 capsule by mouth Daily. - Oral    temazepam (RESTORIL) capsule 7.5 mg 7.5 mg Nightly PRN 2/1/2017 2/11/2017    Sig - Route: Take 1 capsule by mouth At Night As Needed for sleep. - Oral    ticagrelor (BRILINTA) tablet 90 mg 90 mg 2 Times Daily 2/1/2017     Sig - Route: Take 1 tablet by mouth 2 (Two) Times a Day. - Oral    valsartan (DIOVAN) tablet 160 mg 160 mg Daily 2/1/2017     Sig - Route: Take 1 tablet by mouth Daily. - Oral    Cosign for Ordering: Required by Jorge Alvarado MD    " "Linked Group 1:  \"And\" Linked Group Details        bivalirudin (ANGIOMAX) 5 mg/mL in sodium chloride 0.9 % 50 mL infusion (Discontinued) 250 mg Continuous PRN 2017    Si mg Continuous As Needed.    Reason for Discontinue: Patient Discharge    bivalirudin (ANGIOMAX) bolus from bag (Discontinued)  Continuous PRN 2017    Sig: Continuous As Needed.    Reason for Discontinue: Patient Discharge    iopamidol (ISOVUE-370) 76 % injection (Discontinued)  As Needed 2017    Sig: As Needed.    Reason for Discontinue: Patient Discharge    lidocaine (XYLOCAINE) 2% injection (Discontinued)  As Needed 2017    Sig: As Needed.    Reason for Discontinue: Patient Discharge    midazolam (VERSED) injection (Discontinued)  As Needed 2017    Sig: As Needed.    Reason for Discontinue: Patient Discharge    nitroglycerin (NITROSTAT) SL tablet (Discontinued)  As Needed 2017    Sig: As Needed.    Reason for Discontinue: Patient Discharge    NON FORMULARY (Discontinued) 10 mg Nightly 2017    Sig - Route: Take 10 mg by mouth Every Night. - Oral    Reason for Discontinue: Formulary change    NON FORMULARY (Discontinued) 160 mg Daily 2017    Sig - Route: Take 160 mg by mouth Daily. - Oral    Reason for Discontinue: Alternate therapy    O2 (OXYGEN) (Discontinued)  As Needed 2017    Sig: As Needed.    Reason for Discontinue: Patient Discharge    ticagrelor (BRILINTA) tablet (Discontinued)  As Needed 2017    Sig: As Needed.    Reason for Discontinue: Patient Discharge          Lab Results (last 72 hours)     Procedure Component Value Units Date/Time    Lipid Panel [[id number]]  (Abnormal) Collected:  17 1040    Specimen:  Blood Updated:  17 1148     Total Cholesterol 262 (H) mg/dL      Triglycerides 309 (H) mg/dL      HDL Cholesterol 35 (L) mg/dL      LDL Cholesterol  186 (H) mg/dL      LDL/HDL Ratio 4.72 (H) "     Troponin [[id number]]  (Abnormal) Collected:  02/01/17 1040    Specimen:  Blood Updated:  02/01/17 1214     Troponin I 53.200 (C) ng/mL     CK-MB [[id number]]  (Abnormal) Collected:  02/01/17 1228    Specimen:  Blood Updated:  02/01/17 1354     CKMB 59.70 (H) ng/mL     CK [[id number]]  (Abnormal) Collected:  02/01/17 1228    Specimen:  Blood Updated:  02/01/17 1359     Creatine Kinase 2771 (H) U/L     Troponin [[id number]]  (Abnormal) Collected:  02/01/17 1228    Specimen:  Blood Updated:  02/01/17 1421     Troponin I 83.100 (C) ng/mL     Hemoglobin A1c [[id number]]  (Abnormal) Collected:  02/01/17 1040    Specimen:  Blood Updated:  02/01/17 1634     Hemoglobin A1C 7.61 (H) %     CK [[id number]]  (Abnormal) Collected:  02/01/17 1750    Specimen:  Blood Updated:  02/01/17 1844     Creatine Kinase 1355 (H) U/L     CK-MB [[id number]]  (Abnormal) Collected:  02/01/17 1750    Specimen:  Blood Updated:  02/01/17 1854     CKMB 48.20 (H) ng/mL     Troponin [[id number]]  (Abnormal) Collected:  02/01/17 1750    Specimen:  Blood Updated:  02/01/17 1942     Troponin I 135.000 (C) ng/mL     POC Glucose Fingerstick [[id number]]  (Abnormal) Collected:  02/01/17 2054    Specimen:  Blood Updated:  02/01/17 2106     Glucose 180 (H) mg/dL       Sliding Scale AdminMeter: [id number]: [id number] SANTINO EDI       CK [[id number]]  (Abnormal) Collected:  02/02/17 0028    Specimen:  Blood Updated:  02/02/17 0105     Creatine Kinase 900 (H) U/L     CK-MB [[id number]]  (Abnormal) Collected:  02/02/17 0028    Specimen:  Blood Updated:  02/02/17 0116     CKMB 23.20 (H) ng/mL     Troponin [[id number]]  (Abnormal) Collected:  02/02/17 0028    Specimen:  Blood Updated:  02/02/17 0135     Troponin I 76.900 (C) ng/mL     CBC (No Diff) [[id number]]  (Abnormal) Collected:  02/02/17 0555    Specimen:  Blood Updated:  02/02/17 0612     WBC 12.25 (H) 10*3/mm3      RBC 5.09 10*6/mm3      Hemoglobin 15.1 g/dL      Hematocrit 44.3 %      MCV 87.0 fL      MCH 29.7 pg       MCHC 34.1 g/dL      RDW 13.9 %      RDW-SD 44.5 (H) fl      MPV 9.4 fL      Platelets 210 10*3/mm3     POC Glucose Fingerstick [[id number]]  (Abnormal) Collected:  02/02/17 0543    Specimen:  Blood Updated:  02/02/17 0614     Glucose 179 (H) mg/dL       Meter: [id number]: [id number] SANTINO DALEY       Basic Metabolic Panel [[id number]]  (Abnormal) Collected:  02/02/17 0555    Specimen:  Blood Updated:  02/02/17 0631     Glucose 178 (H) mg/dL      BUN 18 mg/dL      Creatinine 1.15 mg/dL      Sodium 134 (L) mmol/L      Potassium 4.1 mmol/L      Chloride 97 mmol/L      CO2 25.0 mmol/L      Calcium 9.2 mg/dL      eGFR   Amer 77 mL/min/1.73      eGFR Non African Amer 64 mL/min/1.73      BUN/Creatinine Ratio 15.7      Anion Gap 12.0 mmol/L     Troponin [[id number]]  (Abnormal) Collected:  02/02/17 0555    Specimen:  Blood Updated:  02/02/17 0647     Troponin I 47.300 (C) ng/mL     Lipid Panel [[id number]]  (Abnormal) Collected:  02/02/17 0555    Specimen:  Blood Updated:  02/02/17 0701     Total Cholesterol 215 (H) mg/dL      Triglycerides 297 (H) mg/dL      HDL Cholesterol 33 (L) mg/dL      LDL Cholesterol  135 (H) mg/dL      LDL/HDL Ratio 3.72 (H)     POC Glucose Fingerstick [[id number]]  (Abnormal) Collected:  02/02/17 1131    Specimen:  Blood Updated:  02/02/17 1150     Glucose 173 (H) mg/dL       Sliding Scale AdminMeter: [id number]: [id number] FUENTES SCHMID             Physician Progress Notes (last 72 hours) (Notes from 1/30/2017  1:25 PM through 2/2/2017  1:25 PM)     No notes of this type exist for this encounter.

## 2017-02-02 NOTE — PLAN OF CARE
Problem: Patient Care Overview (Adult)  Goal: Plan of Care Review  Outcome: Ongoing (interventions implemented as appropriate)    02/02/17 1251   Coping/Psychosocial Response Interventions   Plan Of Care Reviewed With patient;spouse;son   Patient Care Overview   Progress improving   Outcome Evaluation   Outcome Summary/Follow up Plan no CP this shift; pt. up in chair all day; ambulates in room easily; no groin site complications; pt. fearful of Brilinta and risk of bleeding; pt. reluctant to follow low sodium, diabetic diet       Goal: Adult Individualization and Mutuality  Outcome: Ongoing (interventions implemented as appropriate)  Goal: Discharge Needs Assessment  Outcome: Ongoing (interventions implemented as appropriate)    Problem: Acute Coronary Syndrome (ACS) (Adult)  Goal: Signs and Symptoms of Listed Potential Problems Will be Absent or Manageable (Acute Coronary Syndrome)  Outcome: Ongoing (interventions implemented as appropriate)    Problem: Fall Risk (Adult)  Goal: Identify Related Risk Factors and Signs and Symptoms  Outcome: Ongoing (interventions implemented as appropriate)  Goal: Absence of Falls  Outcome: Ongoing (interventions implemented as appropriate)    Problem: Cardiac Catheterization with/without PCI (Adult)  Goal: Signs and Symptoms of Listed Potential Problems Will be Absent or Manageable (Cardiac Catheterization with/without PCI)  Outcome: Ongoing (interventions implemented as appropriate)    Problem: Adjustment to Hospitalization, Illness, Injury, Treatment (Adult,Pediatric)  Goal: Identify Related Risk Factors and Signs and Symptoms  Outcome: Ongoing (interventions implemented as appropriate)  Goal: Adjustment to Events/Situations regarding Hospitalization/Illness/Injury/Treatment  Outcome: Ongoing (interventions implemented as appropriate)  Goal: Continued Mastery/Enhancement of Self-Esteem while Adjusting to Hospitalization/Illness/Injury  Outcome: Ongoing (interventions implemented  as appropriate)

## 2017-02-02 NOTE — PLAN OF CARE
Problem: Patient Care Overview (Adult)  Goal: Plan of Care Review  Outcome: Ongoing (interventions implemented as appropriate)    02/01/17 6415   Coping/Psychosocial Response Interventions   Plan Of Care Reviewed With patient;spouse   Patient Care Overview   Progress improving   Outcome Evaluation   Outcome Summary/Follow up Plan continue to monitor pt comfort/ce/overall status and intervene as needed       Goal: Adult Individualization and Mutuality  Outcome: Ongoing (interventions implemented as appropriate)  Goal: Discharge Needs Assessment  Outcome: Ongoing (interventions implemented as appropriate)    Problem: Acute Coronary Syndrome (ACS) (Adult)  Goal: Signs and Symptoms of Listed Potential Problems Will be Absent or Manageable (Acute Coronary Syndrome)  Outcome: Ongoing (interventions implemented as appropriate)

## 2017-02-03 VITALS
BODY MASS INDEX: 24.71 KG/M2 | HEIGHT: 70 IN | RESPIRATION RATE: 18 BRPM | DIASTOLIC BLOOD PRESSURE: 67 MMHG | OXYGEN SATURATION: 94 % | TEMPERATURE: 96.8 F | HEART RATE: 80 BPM | SYSTOLIC BLOOD PRESSURE: 112 MMHG | WEIGHT: 172.6 LBS

## 2017-02-03 PROBLEM — I21.9 AMI (ACUTE MYOCARDIAL INFARCTION) (HCC): Status: RESOLVED | Noted: 2017-02-01 | Resolved: 2017-02-03

## 2017-02-03 PROBLEM — E78.5 HYPERLIPIDEMIA: Chronic | Status: ACTIVE | Noted: 2017-02-03

## 2017-02-03 LAB
GLUCOSE BLDC GLUCOMTR-MCNC: 140 MG/DL (ref 70–130)
GLUCOSE BLDC GLUCOMTR-MCNC: 168 MG/DL (ref 70–130)
GLUCOSE BLDC GLUCOMTR-MCNC: 179 MG/DL (ref 70–130)
GLUCOSE BLDC GLUCOMTR-MCNC: 180 MG/DL (ref 70–130)

## 2017-02-03 PROCEDURE — 93010 ELECTROCARDIOGRAM REPORT: CPT | Performed by: INTERNAL MEDICINE

## 2017-02-03 PROCEDURE — 93005 ELECTROCARDIOGRAM TRACING: CPT | Performed by: INTERNAL MEDICINE

## 2017-02-03 PROCEDURE — 82962 GLUCOSE BLOOD TEST: CPT

## 2017-02-03 PROCEDURE — 63710000001 INSULIN ASPART PER 5 UNITS: Performed by: INTERNAL MEDICINE

## 2017-02-03 RX ORDER — ASPIRIN 81 MG/1
81 TABLET, CHEWABLE ORAL DAILY
Qty: 90 TABLET | Refills: 12 | Status: SHIPPED | OUTPATIENT
Start: 2017-02-03

## 2017-02-03 RX ORDER — ATORVASTATIN CALCIUM 40 MG/1
40 TABLET, FILM COATED ORAL NIGHTLY
Qty: 90 TABLET | Refills: 12 | Status: SHIPPED | OUTPATIENT
Start: 2017-02-03 | End: 2018-03-02

## 2017-02-03 RX ORDER — ATORVASTATIN CALCIUM 40 MG/1
40 TABLET, FILM COATED ORAL NIGHTLY
Qty: 90 TABLET | Refills: 12 | Status: SHIPPED | OUTPATIENT
Start: 2017-02-03 | End: 2017-02-03

## 2017-02-03 RX ADMIN — TICAGRELOR 90 MG: 90 TABLET ORAL at 08:37

## 2017-02-03 RX ADMIN — VALSARTAN 160 MG: 160 TABLET, FILM COATED ORAL at 08:37

## 2017-02-03 RX ADMIN — INSULIN ASPART 2 UNITS: 100 INJECTION, SOLUTION INTRAVENOUS; SUBCUTANEOUS at 20:34

## 2017-02-03 RX ADMIN — HYDROCHLOROTHIAZIDE 12.5 MG: 12.5 CAPSULE ORAL at 08:37

## 2017-02-03 RX ADMIN — ASPIRIN 81 MG 81 MG: 81 TABLET ORAL at 08:37

## 2017-02-03 RX ADMIN — DOCUSATE SODIUM 100 MG: 100 CAPSULE, LIQUID FILLED ORAL at 08:37

## 2017-02-03 RX ADMIN — ATENOLOL 25 MG: 25 TABLET ORAL at 08:37

## 2017-02-03 RX ADMIN — INSULIN ASPART 2 UNITS: 100 INJECTION, SOLUTION INTRAVENOUS; SUBCUTANEOUS at 08:37

## 2017-02-03 RX ADMIN — CEFDINIR 300 MG: 300 CAPSULE ORAL at 11:37

## 2017-02-03 RX ADMIN — ATENOLOL 25 MG: 25 TABLET ORAL at 17:21

## 2017-02-03 RX ADMIN — TAMSULOSIN HYDROCHLORIDE 0.4 MG: 0.4 CAPSULE ORAL at 08:37

## 2017-02-03 RX ADMIN — ATORVASTATIN CALCIUM 40 MG: 40 TABLET, FILM COATED ORAL at 20:28

## 2017-02-03 RX ADMIN — TICAGRELOR 90 MG: 90 TABLET ORAL at 17:21

## 2017-02-03 RX ADMIN — INSULIN ASPART 2 UNITS: 100 INJECTION, SOLUTION INTRAVENOUS; SUBCUTANEOUS at 11:37

## 2017-02-03 RX ADMIN — SITAGLIPTIN 100 MG: 100 TABLET, FILM COATED ORAL at 08:37

## 2017-02-03 NOTE — PLAN OF CARE
"Problem: Patient Care Overview (Adult)  Goal: Adult Individualization and Mutuality  Outcome: Ongoing (interventions implemented as appropriate)    02/01/17 1749 02/02/17 0605 02/02/17 1557   Individualization   Patient Specific Preferences --  pt likes to ambulate in cintron when feeling anxious --    Patient Specific Goals pt wishs to go home and resume normal activity. nursing goal to be free of chest pain --  --    Mutuality/Individual Preferences   What Anxieties, Fears or Concerns Do You Have About Your Health or Care? --  --  pt. fearful of new medication Brilinta and its bleeding risk   What Information Would Help Us Give You More Personalized Care? 'continue to keep us informed\" --  --        Goal: Discharge Needs Assessment  Outcome: Ongoing (interventions implemented as appropriate)    02/02/17 0605 02/02/17 1557   Discharge Needs Assessment   Concerns To Be Addressed --  medication concerns   Readmission Within The Last 30 Days no previous admission in last 30 days --    Current Health   Anticipated Changes Related to Illness none --          Problem: Acute Coronary Syndrome (ACS) (Adult)  Goal: Signs and Symptoms of Listed Potential Problems Will be Absent or Manageable (Acute Coronary Syndrome)  Outcome: Ongoing (interventions implemented as appropriate)    02/02/17 1557 02/03/17 0423   Acute Coronary Syndrome (ACS)   Problems Assessed (Acute Coronary Syndrome (ACS)) all --    Problems Present (Acute Coronary Syndrome (ACS)) --  none         Problem: Fall Risk (Adult)  Goal: Identify Related Risk Factors and Signs and Symptoms  Outcome: Ongoing (interventions implemented as appropriate)    02/02/17 1557   Fall Risk   Fall Risk: Related Risk Factors culprit medication(s);polypharmacy;environment unfamiliar   Fall Risk: Signs and Symptoms presence of risk factors       Goal: Absence of Falls  Outcome: Ongoing (interventions implemented as appropriate)    02/03/17 0423   Fall Risk (Adult)   Absence of Falls " making progress toward outcome         Problem: Cardiac Catheterization with/without PCI (Adult)  Goal: Signs and Symptoms of Listed Potential Problems Will be Absent or Manageable (Cardiac Catheterization with/without PCI)  Outcome: Ongoing (interventions implemented as appropriate)    02/02/17 1557 02/03/17 0423   Cardiac Catheterization with/without PCI   Problems Assessed (Cardiac Catheterization) all --    Problems Present (Cardiac Catheterization) --  none         Problem: Adjustment to Hospitalization, Illness, Injury, Treatment (Adult,Pediatric)  Goal: Identify Related Risk Factors and Signs and Symptoms  Outcome: Ongoing (interventions implemented as appropriate)    02/02/17 1557   Adjustment to Hospitalization, Illness, Injury, Treatment   Adjustment to Hospitalization/Illness/Injury/Treatment: Related Risk Factors environment unfamiliar;knowledge deficit;severity of illness   Signs and Symptoms (Adjustment to Hospital/Illness) anxiety/fear;activity level change       Goal: Adjustment to Events/Situations regarding Hospitalization/Illness/Injury/Treatment  Outcome: Ongoing (interventions implemented as appropriate)    02/03/17 0423   Adjustment to Hospitalization, Illness, Injury, Treatment (Adult,Pediatric)   Adjustment to Events/Situations regarding Hospitalization/Illness/Injury/Treatment making progress toward outcome       Goal: Continued Mastery/Enhancement of Self-Esteem while Adjusting to Hospitalization/Illness/Injury  Outcome: Ongoing (interventions implemented as appropriate)    02/03/17 0423   Adjustment to Hospitalization, Illness, Injury, Treatment (Adult,Pediatric)   Continued Mastery/Enhancement of Self-Esteem while Adjusting to Hospitalization/Illness/Injury making progress toward outcome

## 2017-02-03 NOTE — CONSULTS
[facility] Medicine Consult  Inpatient Consult to Hospitalist  Consult performed by: DOTTY العراقي  Consult ordered by: JORGE ALVARADO          Date of Admission: 2/1/2017  Date of Consult: 02/02/17    Primary Care Physician: Willis Green MD  Referring Physician:  Jorge Alvarado MD      Chief Complaint/Reason for Consultation: Patient was admitted for acute myocardial infarction and had coronary angina performed.    HPI: Patient is 66-year-old male with significant medical history of having arthritis, hypertension, coronary atherosclerosis, dyslipidemia, dyslipidemia and diabetes.  Patient was admitted by cardiology team for acute myocardial infarction and had coronary angina performed.  Patient was observed in intensive care unit overnight and we were consulted for assistance in management of diabetes.  Patient states his diabetes mellitus and blood pressure have been under control for past few months.  Patient states he has been compliant with his medication and his fasting blood sugar have been ranging between 120-160.  He states he has been compliant with medication and has been following with his routine primary care physician on a regular basis.  Patient denies increase in his appetite or change in polydipsia.  Patient states he has been feeling well prior to this incident.    Past Medical History:  has a past medical history of Arthritis; Benign essential hypertension; Coronary arteriosclerosis; Dyslipidemia; Hypercholesterolemia; Male hypogonadism; and Type 2 diabetes mellitus.    Past Surgical History:  has a past surgical history that includes rt/lt heart catheters (N/A, 2/1/2017) and Cardiac catheterization (N/A, 2/1/2017).    Family History: family history includes Alcohol abuse in his father; Cancer in his mother and sister; Diabetes in his son; Heart disease in his brother, father, and mother; Hyperlipidemia in his other; Hypertension in his  "brother, father, mother, and other; Kidney disease in his mother; Mental illness in his mother; Stroke in his mother.    Social History:  reports that he has never smoked. He does not have any smokeless tobacco history on file. He reports that he does not drink alcohol or use illicit drugs.    Allergies:   Allergies   Allergen Reactions   • Albuterol Other (See Comments)     \"closes\" him \"down\"   • Prevacid [Lansoprazole] Other (See Comments)     \"chokes him up\"       Medications: Scheduled Meds:    aspirin 81 mg Oral Daily   atenolol 25 mg Oral BID   atorvastatin 40 mg Oral Nightly   cefdinir 300 mg Oral Q12H   docusate sodium 100 mg Oral BID   gentamicin 1 drop Both Eyes TID   valsartan 160 mg Oral Daily   And      hydrochlorothiazide 12.5 mg Oral Daily   insulin aspart 0-7 Units Subcutaneous 4x Daily AC & at Bedtime   prednisoLONE acetate 1 drop Both Eyes Q12H   SITagliptin 100 mg Oral Daily   tamsulosin 0.4 mg Oral Daily   ticagrelor 90 mg Oral BID     Continuous Infusions:   PRN Meds:.•  acetaminophen  •  dextrose  •  dextrose  •  glucagon (human recombinant)  •  HYDROcodone-acetaminophen  •  HYDROcodone-acetaminophen  •  HYDROmorphone  •  magnesium hydroxide  •  ondansetron **OR** ondansetron ODT **OR** ondansetron  •  sodium chloride  •  temazepam    Review of Systems:  Review of Systems   Constitutional: Negative for appetite change, chills, diaphoresis and fever.   HENT: Negative for congestion, rhinorrhea, sore throat and trouble swallowing.    Eyes: Negative for visual disturbance.   Respiratory: Negative for cough, chest tightness, shortness of breath and wheezing.    Cardiovascular: Negative for chest pain, palpitations and leg swelling.   Gastrointestinal: Negative for abdominal pain, blood in stool, diarrhea, nausea and vomiting.   Endocrine: Negative for cold intolerance and heat intolerance.   Genitourinary: Negative for decreased urine volume and difficulty urinating.   Musculoskeletal: Negative " for back pain, gait problem and neck pain.   Skin: Negative for rash.   Neurological: Negative for dizziness, syncope, weakness, light-headedness, numbness and headaches.   Psychiatric/Behavioral: The patient is not nervous/anxious.       Otherwise complete ROS is negative except as mentioned above.    Physical Exam:   Temp:  [96.7 °F (35.9 °C)-98.8 °F (37.1 °C)] 98.8 °F (37.1 °C)  Heart Rate:  [65-81] 81  Resp:  [16-22] 18  BP: (108-169)/(60-99) 130/74  Physical Exam   Constitutional: He is oriented to person, place, and time. He appears well-developed and well-nourished.   HENT:   Head: Normocephalic and atraumatic.   Nose: Nose normal.   Eyes: Conjunctivae and EOM are normal. Pupils are equal, round, and reactive to light.   Neck: Normal range of motion. Neck supple. No JVD present. No tracheal deviation present. No thyromegaly present.   Cardiovascular: Normal rate, regular rhythm, normal heart sounds and intact distal pulses.    Pulmonary/Chest: Effort normal and breath sounds normal. No respiratory distress. He has no wheezes. He has no rales. He exhibits no tenderness.   Abdominal: Soft. Bowel sounds are normal. He exhibits no distension. There is no tenderness. There is no rebound and no guarding.   Musculoskeletal: Normal range of motion. He exhibits no edema.   Lymphadenopathy:     He has no cervical adenopathy.   Neurological: He is alert and oriented to person, place, and time. He has normal reflexes. No cranial nerve deficit.   Skin: Skin is warm and dry.   Intact   Psychiatric: He has a normal mood and affect.   Nursing note and vitals reviewed.        Results Reviewed:  I have personally reviewed current lab, radiology, and data and agree with results.  Lab Results (last 24 hours)     Procedure Component Value Units Date/Time    CK-MB [[id number]]  (Abnormal) Collected:  02/01/17 1750    Specimen:  Blood Updated:  02/01/17 1854     CKMB 48.20 (H) ng/mL     Troponin [[id number]]  (Abnormal) Collected:   02/01/17 1750    Specimen:  Blood Updated:  02/01/17 1942     Troponin I 135.000 (C) ng/mL     POC Glucose Fingerstick [[id number]]  (Abnormal) Collected:  02/01/17 2054    Specimen:  Blood Updated:  02/01/17 2106     Glucose 180 (H) mg/dL       Sliding Scale AdminMeter: [id number]: [id number] SANTINO DALEY       CK [[id number]]  (Abnormal) Collected:  02/02/17 0028    Specimen:  Blood Updated:  02/02/17 0105     Creatine Kinase 900 (H) U/L     CK-MB [[id number]]  (Abnormal) Collected:  02/02/17 0028    Specimen:  Blood Updated:  02/02/17 0116     CKMB 23.20 (H) ng/mL     Troponin [[id number]]  (Abnormal) Collected:  02/02/17 0028    Specimen:  Blood Updated:  02/02/17 0135     Troponin I 76.900 (C) ng/mL     CBC (No Diff) [[id number]]  (Abnormal) Collected:  02/02/17 0555    Specimen:  Blood Updated:  02/02/17 0612     WBC 12.25 (H) 10*3/mm3      RBC 5.09 10*6/mm3      Hemoglobin 15.1 g/dL      Hematocrit 44.3 %      MCV 87.0 fL      MCH 29.7 pg      MCHC 34.1 g/dL      RDW 13.9 %      RDW-SD 44.5 (H) fl      MPV 9.4 fL      Platelets 210 10*3/mm3     POC Glucose Fingerstick [[id number]]  (Abnormal) Collected:  02/02/17 0543    Specimen:  Blood Updated:  02/02/17 0614     Glucose 179 (H) mg/dL       Meter: [id number]: [id number] SANTINO DALEY       Basic Metabolic Panel [[id number]]  (Abnormal) Collected:  02/02/17 0555    Specimen:  Blood Updated:  02/02/17 0631     Glucose 178 (H) mg/dL      BUN 18 mg/dL      Creatinine 1.15 mg/dL      Sodium 134 (L) mmol/L      Potassium 4.1 mmol/L      Chloride 97 mmol/L      CO2 25.0 mmol/L      Calcium 9.2 mg/dL      eGFR   Amer 77 mL/min/1.73      eGFR Non African Amer 64 mL/min/1.73      BUN/Creatinine Ratio 15.7      Anion Gap 12.0 mmol/L     Troponin [[id number]]  (Abnormal) Collected:  02/02/17 0555    Specimen:  Blood Updated:  02/02/17 0647     Troponin I 47.300 (C) ng/mL     Lipid Panel [[id number]]  (Abnormal) Collected:  02/02/17 0555    Specimen:  Blood  Updated:  02/02/17 0701     Total Cholesterol 215 (H) mg/dL      Triglycerides 297 (H) mg/dL      HDL Cholesterol 33 (L) mg/dL      LDL Cholesterol  135 (H) mg/dL      LDL/HDL Ratio 3.72 (H)     POC Glucose Fingerstick [[id number]]  (Abnormal) Collected:  02/02/17 1131    Specimen:  Blood Updated:  02/02/17 1150     Glucose 173 (H) mg/dL       Sliding Scale AdminMeter: [id number]: [id number] Stitch.es       POC Glucose Fingerstick [[id number]]  (Abnormal) Collected:  02/02/17 1645    Specimen:  Blood Updated:  02/02/17 1705     Glucose 148 (H) mg/dL       Meter: [id number]: [id number] Stitch.es           Imaging Results (last 24 hours)     ** No results found for the last 24 hours. **          Assessment:  Hospital Problem List     Hypogonadism in male    Type 2 diabetes mellitus without complication, without long-term current use of insulin    Hypertension associated with diabetes    Mixed diabetic hyperlipidemia associated with type 2 diabetes mellitus    AMI (acute myocardial infarction)                Recommendations: We'll continue monitoring patient's blood glucose in the hospital setting.  Fingersticks to be done 3 times a day before meals and at bedtime.  We will continue with insulin coverage. We'll continue monitoring patient hospital setting and treat patient as course dictates.  We'll continue with home medication and home dosage.    I would like to thank Dr. Alvarado for letting us participate in this patient's care.  We'll continue to monitor patient along with you.    I discussed the patients findings and my recommendations with: Patient and Family member present at bedside.    Jorge Luis Mathias MD  02/02/17  6:53 PM

## 2017-02-03 NOTE — PAYOR COMM NOTE
"Erick Antonio (66 y.o. Male)     Date of Birth Social Security Number Address Home Phone MRN    [id number]  [id number] [facility] [address] [phone] [id number]    Latter day Marital Status          Jain        Admission Date Admission Type Admitting Provider Attending Provider Department, Room/Bed    2/1/17 Urgent Jorge Alvarado MD Kapadia, Deepak, MD [address], [address]    Discharge Date Discharge Disposition Discharge Destination                      Attending Provider: Jorge Alvarado MD     Allergies:  Albuterol, Prevacid [Lansoprazole]    Isolation:  None   Infection:  None   Code Status:  FULL    Ht:  70\" (177.8 cm)   Wt:  172 lb 9.6 oz (78.3 kg)    Admission Cmt:  None   Principal Problem:  None                Active Insurance as of 2/1/2017     Primary Coverage     Payor Plan Insurance Group Employer/Plan Group    CIGNA CIGNA [id number]     Payor Plan Address Payor Plan Phone Number Effective From Effective To    [address] [phone] 9/1/2015     [place] [id number]       Subscriber Name Subscriber Birth Date Member ID       ORI ANTONIO 1950 [id number]           Secondary Coverage     Payor Plan Insurance Group Employer/Plan Group    MEDICARE MEDICARE A ONLY      Payor Plan Address Payor Plan Phone Number Effective From Effective To    [address] [phone] 10/1/2016     [place] [address]       Subscriber Name Subscriber Birth Date Member ID       ERICK ANTONIO 1950 [id number]                 Emergency Contacts      (Rel.) Home Phone Work Phone Mobile Phone    Ori Antonio (Spouse) [phone] -- --        Jorge Luis Mathias MD Physician Addendum Medicine Consults Date of Service: 2/2/2017  6:46 PM     Consult Orders:     Inpatient Consult to Hospitalist [[id number]] ordered by Jorge Alvarado MD at 02/01/17 0901             []Hide copied text  []Hover for attribution information          [facility] " [facility] Medicine Consult  Inpatient Consult to Hospitalist  Consult performed by: DOTTY العراقي  Consult ordered by: JORGE ALVARADO           Date of Admission: 2/1/2017  Date of Consult: 02/02/17     Primary Care Physician: Willis Green MD  Referring Physician: Jorge Alvarado MD        Chief Complaint/Reason for Consultation: Patient was admitted for acute myocardial infarction and had coronary angina performed.     HPI: Patient is 66-year-old male with significant medical history of having arthritis, hypertension, coronary atherosclerosis, dyslipidemia, dyslipidemia and diabetes. Patient was admitted by cardiology team for acute myocardial infarction and had coronary angina performed. Patient was observed in intensive care unit overnight and we were consulted for assistance in management of diabetes. Patient states his diabetes mellitus and blood pressure have been under control for past few months. Patient states he has been compliant with his medication and his fasting blood sugar have been ranging between 120-160. He states he has been compliant with medication and has been following with his routine primary care physician on a regular basis. Patient denies increase in his appetite or change in polydipsia. Patient states he has been feeling well prior to this incident.     Past Medical History:  has a past medical history of Arthritis; Benign essential hypertension; Coronary arteriosclerosis; Dyslipidemia; Hypercholesterolemia; Male hypogonadism; and Type 2 diabetes mellitus.     Past Surgical History:  has a past surgical history that includes rt/lt heart catheters (N/A, 2/1/2017) and Cardiac catheterization (N/A, 2/1/2017).     Family History: family history includes Alcohol abuse in his father; Cancer in his mother and sister; Diabetes in his son; Heart disease in his brother, father, and mother; Hyperlipidemia in his other; Hypertension in his brother, father,  "mother, and other; Kidney disease in his mother; Mental illness in his mother; Stroke in his mother.     Social History:  reports that he has never smoked. He does not have any smokeless tobacco history on file. He reports that he does not drink alcohol or use illicit drugs.     Allergies:         Allergies   Allergen Reactions   • Albuterol Other (See Comments)       \"closes\" him \"down\"   • Prevacid [Lansoprazole] Other (See Comments)       \"chokes him up\"         Medications: Scheduled Meds:     aspirin 81 mg Oral Daily   atenolol 25 mg Oral BID   atorvastatin 40 mg Oral Nightly   cefdinir 300 mg Oral Q12H   docusate sodium 100 mg Oral BID   gentamicin 1 drop Both Eyes TID   valsartan 160 mg Oral Daily   And         hydrochlorothiazide 12.5 mg Oral Daily   insulin aspart 0-7 Units Subcutaneous 4x Daily AC & at Bedtime   prednisoLONE acetate 1 drop Both Eyes Q12H   SITagliptin 100 mg Oral Daily   tamsulosin 0.4 mg Oral Daily   ticagrelor 90 mg Oral BID      Continuous Infusions:   PRN Meds:.• acetaminophen  • dextrose  • dextrose  • glucagon (human recombinant)  • HYDROcodone-acetaminophen  • HYDROcodone-acetaminophen  • HYDROmorphone  • magnesium hydroxide  • ondansetron **OR** ondansetron ODT **OR** ondansetron  • sodium chloride  • temazepam     Review of Systems:  Review of Systems   Constitutional: Negative for appetite change, chills, diaphoresis and fever.   HENT: Negative for congestion, rhinorrhea, sore throat and trouble swallowing.   Eyes: Negative for visual disturbance.   Respiratory: Negative for cough, chest tightness, shortness of breath and wheezing.   Cardiovascular: Negative for chest pain, palpitations and leg swelling.   Gastrointestinal: Negative for abdominal pain, blood in stool, diarrhea, nausea and vomiting.   Endocrine: Negative for cold intolerance and heat intolerance.   Genitourinary: Negative for decreased urine volume and difficulty urinating.   Musculoskeletal: Negative for back " pain, gait problem and neck pain.   Skin: Negative for rash.   Neurological: Negative for dizziness, syncope, weakness, light-headedness, numbness and headaches.   Psychiatric/Behavioral: The patient is not nervous/anxious.      Otherwise complete ROS is negative except as mentioned above.     Physical Exam:   Temp: [96.7 °F (35.9 °C)-98.8 °F (37.1 °C)] 98.8 °F (37.1 °C)  Heart Rate: [65-81] 81  Resp: [16-22] 18  BP: (108-169)/(60-99) 130/74  Physical Exam   Constitutional: He is oriented to person, place, and time. He appears well-developed and well-nourished.   HENT:   Head: Normocephalic and atraumatic.   Nose: Nose normal.   Eyes: Conjunctivae and EOM are normal. Pupils are equal, round, and reactive to light.   Neck: Normal range of motion. Neck supple. No JVD present. No tracheal deviation present. No thyromegaly present.   Cardiovascular: Normal rate, regular rhythm, normal heart sounds and intact distal pulses.   Pulmonary/Chest: Effort normal and breath sounds normal. No respiratory distress. He has no wheezes. He has no rales. He exhibits no tenderness.   Abdominal: Soft. Bowel sounds are normal. He exhibits no distension. There is no tenderness. There is no rebound and no guarding.   Musculoskeletal: Normal range of motion. He exhibits no edema.   Lymphadenopathy:   He has no cervical adenopathy.   Neurological: He is alert and oriented to person, place, and time. He has normal reflexes. No cranial nerve deficit.   Skin: Skin is warm and dry.   Intact   Psychiatric: He has a normal mood and affect.   Nursing note and vitals reviewed.           Results Reviewed:  I have personally reviewed current lab, radiology, and data and agree with results.  Lab Results (last 24 hours)     Procedure Component Value Units Date/Time             CK-MB [[id number]] (Abnormal) Collected: 02/01/17 1750     Specimen: Blood Updated: 02/01/17 1854       CKMB 48.20 (H) ng/mL       Troponin [[id number]] (Abnormal) Collected:  02/01/17 1750     Specimen: Blood Updated: 02/01/17 1942       Troponin I 135.000 (C) ng/mL       POC Glucose Fingerstick [[id number]] (Abnormal) Collected: 02/01/17 2054     Specimen: Blood Updated: 02/01/17 2106       Glucose 180 (H) mg/dL           Sliding Scale AdminMeter: [id number]: [id number] SANTINO DALEY         CK [[id number]] (Abnormal) Collected: 02/02/17 0028     Specimen: Blood Updated: 02/02/17 0105       Creatine Kinase 900 (H) U/L       CK-MB [[id number]] (Abnormal) Collected: 02/02/17 0028     Specimen: Blood Updated: 02/02/17 0116       CKMB 23.20 (H) ng/mL       Troponin [[id number]] (Abnormal) Collected: 02/02/17 0028     Specimen: Blood Updated: 02/02/17 0135       Troponin I 76.900 (C) ng/mL       CBC (No Diff) [[id number]] (Abnormal) Collected: 02/02/17 0555     Specimen: Blood Updated: 02/02/17 0612       WBC 12.25 (H) 10*3/mm3         RBC 5.09 10*6/mm3         Hemoglobin 15.1 g/dL         Hematocrit 44.3 %         MCV 87.0 fL         MCH 29.7 pg         MCHC 34.1 g/dL         RDW 13.9 %         RDW-SD 44.5 (H) fl         MPV 9.4 fL         Platelets 210 10*3/mm3       POC Glucose Fingerstick [[id number]] (Abnormal) Collected: 02/02/17 0543     Specimen: Blood Updated: 02/02/17 0614       Glucose 179 (H) mg/dL           Meter: [id number]: [id number] SANTINO DALEY         Basic Metabolic Panel [[id number]] (Abnormal) Collected: 02/02/17 0555     Specimen: Blood Updated: 02/02/17 0631       Glucose 178 (H) mg/dL         BUN 18 mg/dL         Creatinine 1.15 mg/dL         Sodium 134 (L) mmol/L         Potassium 4.1 mmol/L         Chloride 97 mmol/L         CO2 25.0 mmol/L         Calcium 9.2 mg/dL         eGFR African Amer 77 mL/min/1.73         eGFR Non African Amer 64 mL/min/1.73         BUN/Creatinine Ratio 15.7         Anion Gap 12.0 mmol/L       Troponin [[id number]] (Abnormal) Collected: 02/02/17 0555     Specimen: Blood Updated: 02/02/17 0647       Troponin I 47.300 (C) ng/mL        Lipid Panel [[id number]] (Abnormal) Collected: 02/02/17 0555     Specimen: Blood Updated: 02/02/17 0701       Total Cholesterol 215 (H) mg/dL         Triglycerides 297 (H) mg/dL         HDL Cholesterol 33 (L) mg/dL         LDL Cholesterol  135 (H) mg/dL         LDL/HDL Ratio 3.72 (H)       POC Glucose Fingerstick [[id number]] (Abnormal) Collected: 02/02/17 1131     Specimen: Blood Updated: 02/02/17 1150       Glucose 173 (H) mg/dL           Sliding Scale AdminMeter: [id number]: [id number] [facility]         POC Glucose Fingerstick [[id number]] (Abnormal) Collected: 02/02/17 1645     Specimen: Blood Updated: 02/02/17 1705       Glucose 148 (H) mg/dL           Meter: [id number]: [id number] ChronicityAH                 Imaging Results (last 24 hours)      ** No results found for the last 24 hours. **             Assessment:      Hospital Problem List      Hypogonadism in male     Type 2 diabetes mellitus without complication, without long-term current use of insulin     Hypertension associated with diabetes     Mixed diabetic hyperlipidemia associated with type 2 diabetes mellitus     AMI (acute myocardial infarction)                   Recommendations: We'll continue monitoring patient's blood glucose in the hospital setting. Fingersticks to be done 3 times a day before meals and at bedtime. We will continue with insulin coverage. We'll continue monitoring patient hospital setting and treat patient as course dictates. We'll continue with home medication and home dosage.     I would like to thank Dr. Alvarado for letting us participate in this patient's care. We'll continue to monitor patient along with you.     I discussed the patients findings and my recommendations with: Patient and Family member present at bedside.     Jorge Luis Mathias MD  02/02/17  6:53 PM                      Revision History       Date/Time User Provider Type Action     2/2/2017  6:58 PM Jorge Luis Mathias MD Physician  Addend     2/2/2017  6:55 PM Jorge Luis Mathias MD Physician Sign     View Details Report          Routing History       Date/Time From To Method     2/2/2017  6:58 PM MD Jorge Delgadillo MD Fax                       Vital Signs (last 24 hours)       02/02 0700  -  02/03 0659 02/03 0700  -  02/03 1458   Most Recent    Temp (°F) 96.7 -  98.8      98.2     98.2 (36.8)    Heart Rate 67 -  83    76 -  82     82    Resp 16 -  22      18     18    /69 -  169/99      109/73     109/73    SpO2 (%) 96 -  98      99     99

## 2017-02-04 NOTE — PLAN OF CARE
Problem: Patient Care Overview (Adult)  Goal: Plan of Care Review  Outcome: Ongoing (interventions implemented as appropriate)    02/03/17 1820   Coping/Psychosocial Response Interventions   Plan Of Care Reviewed With patient       Goal: Adult Individualization and Mutuality  Outcome: Ongoing (interventions implemented as appropriate)  Goal: Discharge Needs Assessment  Outcome: Ongoing (interventions implemented as appropriate)    Problem: Acute Coronary Syndrome (ACS) (Adult)  Goal: Signs and Symptoms of Listed Potential Problems Will be Absent or Manageable (Acute Coronary Syndrome)  Outcome: Ongoing (interventions implemented as appropriate)    Problem: Fall Risk (Adult)  Goal: Identify Related Risk Factors and Signs and Symptoms  Outcome: Outcome(s) achieved Date Met:  02/03/17  Goal: Absence of Falls  Outcome: Outcome(s) achieved Date Met:  02/03/17    Problem: Cardiac Catheterization with/without PCI (Adult)  Goal: Signs and Symptoms of Listed Potential Problems Will be Absent or Manageable (Cardiac Catheterization with/without PCI)  Outcome: Ongoing (interventions implemented as appropriate)    Problem: Adjustment to Hospitalization, Illness, Injury, Treatment (Adult,Pediatric)  Goal: Identify Related Risk Factors and Signs and Symptoms  Outcome: Ongoing (interventions implemented as appropriate)  Goal: Adjustment to Events/Situations regarding Hospitalization/Illness/Injury/Treatment  Outcome: Ongoing (interventions implemented as appropriate)  Goal: Continued Mastery/Enhancement of Self-Esteem while Adjusting to Hospitalization/Illness/Injury  Outcome: Ongoing (interventions implemented as appropriate)

## 2017-02-04 NOTE — DISCHARGE SUMMARY
DATE OF ADMISSION: 02/01/2017  DATE OF DISCHARGE: 02/03/2017    PRIMARY CARE PHYSICIAN: Willis Green MD    DISCHARGE DIAGNOSES:   1.  Chest pain.  2.  Acute lateral wall myocardial infarction.   3.  Rescue percutaneous transluminal coronary angioplasty and stenting of the 2nd obtuse marginal branch with deployment of a 2.5 mm x 23 mm Alpine drug-eluting stent.   4.  Percutaneous transluminal coronary angioplasty and stenting of the midcircumflex artery with deployment of a 3 mm x 12 mm Alpine drug-eluting stent.   5.  Percutaneous transluminal coronary angioplasty of the distal 2nd obtuse marginal branch using a 2 mm x 12 mm balloon.  6.  Patient has 60% to 70% stenosis in the proximal portion of the posterior descending artery, which was a small-caliber vessel, treated medically.   7.  A 70% stenosis in the midleft anterior descending artery with evidence of good LUDMILA 3 flow.   8.  A 70% in the diagonal branch proximally, which was also a small-caliber vessel, treated medically.  9.  Arterial hypertension.  10.  Hypertensive heart disease.   11.  Hyperlipidemia.   12.  Diabetes.   13.  Benign prostatic hypertrophy.  14.  Bronchitis.    DISCHARGE MEDICATIONS: Please refer to the medicine reconciliation list.     PHYSICAL EXAMINATION:   GENERAL: A well-developed, well-nourished individual, currently in no acute distress. Able to lay flat.   VITAL SIGNS: Afebrile. Blood pressure of 114/75, pulse of 70 and regular, respirations of 18, oxygen saturation of 97%.   HEENT: Atraumatic, normocephalic. Pupils equally reacting to light. Extraocular movements are intact. Pink conjunctivae. Anicteric sclerae. Moist mucous membranes. Throat is clear.  NECK: There is no jugular venous distention. Thyroid is not palpable.   LUNGS: Clear.  CARDIAC: Regular, S1 and S2. Holosystolic murmur best heard at the apex. No appreciable click.  EXTREMITIES: There is no edema.  ABDOMEN: Soft, nontender. Liver and spleen are not  palpable.  NEUROLOGIC: Patient is awake, alert, oriented x3. No focal changes. Cranial nerves II-XII are grossly intact. Motor power and tone in the upper and lower extremities are grossly intact.    HOSPITAL COURSE: This is a 66-year-old  male with a past medical history significant for arterial hypertension, hypertensive heart disease, hypogonadism, type 2 diabetes, who presented to [facility] with symptoms of substernal chest pain. Patient on initial presentation had ST segment elevation in I and AVL suggestive of acute lateral wall myocardial infarction. Due to the patient's symptom complex, the patient was subsequently transferred to [facility] by helicopter. The patient on arrival continued to have persistent chest pain. The patient underwent an emergent coronary angiogram and rescue PTCA. Patient was found to have 100% occlusion of the 1st obtuse marginal branch, which was a small-caliber vessel, and a 2nd obtuse marginal branch which was a medium-caliber vessel which was in the infarct-related vessel. Patient underwent successful PTCA and stenting of the 2nd obtuse marginal branch with deployment of a 2.5 mm x 23 mm Alpine drug-eluting stent. Patient in the midcircumflex artery had stenosis which underwent percutaneous intervention with deployment of a 3 mm x 12 mm Alpine drug-eluting stent. Post stent deployment, patient was subsequently transferred to the floor. Patient had serial cardiac enzymes checked. Patient’s peak troponin was 135 with peak CPK of 2700. Prior to the discharge, patient’s troponin was 47. Patient underwent lipid profile check which has revealed an LDL of 135, triglycerides of 297, and HDL of 33, and a total cholesterol of 215. Patient’s hemoglobin prior to the discharge from the hospital was 15 and hematocrit of 44 and platelet count of 210,000.     Patient’s resting electrocardiogram prior to the discharge had revealed sinus rhythm with  subtle 1 and AVL ST-T wave changes. Patient did not have any further recurrent symptoms of chest discomfort. Patient had ambulation in the hallway and patient was discharged home in stable condition to follow up with Dr. Willis Green in [place] in 1 week and with myself in 4-6 weeks. Patient had been counseled extensively on weight reduction, lifestyle modification, and to be compliant with the medication. Patient was started on Brilinta 90 mg b.i.d. Patient was also found to have a left anterior descending artery 70% stenosis, which will be treated medically at the present time. Should the patient continue to have symptoms of chest pain, patient would be subjected to a repeat coronary angiogram and staged PTCA of the left anterior descending artery in 4-6 weeks. Patient had ambulation in the hallway. Patient did not have any further recurrent symptoms of chest pain. Patient did not have any evidence of cole-infarct ischemia or cole-infarct arrhythmia. Patient was discharged home in stable condition, to follow up with Dr. Willis Green in [place] and with myself as scheduled. Patient was discharged home in stable condition.       CC: MD Lopez Martini MD Deepak N. Kapadia, MD  Dictation Date/Time:      02/03/2017 21:06:36  (Eastern Time Zone)  Transcribed Date/Time: 02/03/2017 22:11:34 (Eastern Time Zone)  Dictator/ Initials:   JERRY/jose c  Document ID:                [id number]  Job ID: [id number]

## 2017-02-28 ENCOUNTER — OFFICE VISIT (OUTPATIENT)
Dept: ENDOCRINOLOGY | Facility: CLINIC | Age: 67
End: 2017-02-28

## 2017-02-28 VITALS
DIASTOLIC BLOOD PRESSURE: 72 MMHG | WEIGHT: 172.7 LBS | BODY MASS INDEX: 25.58 KG/M2 | HEIGHT: 69 IN | HEART RATE: 61 BPM | SYSTOLIC BLOOD PRESSURE: 120 MMHG

## 2017-02-28 DIAGNOSIS — E11.42 DIABETIC POLYNEUROPATHY ASSOCIATED WITH TYPE 2 DIABETES MELLITUS (HCC): ICD-10-CM

## 2017-02-28 DIAGNOSIS — E55.9 VITAMIN D DEFICIENCY: ICD-10-CM

## 2017-02-28 DIAGNOSIS — I43 TYPE 2 DIABETES MELLITUS WITH DIABETIC CARDIOMYOPATHY (HCC): Primary | ICD-10-CM

## 2017-02-28 DIAGNOSIS — E29.1 HYPOGONADISM IN MALE: ICD-10-CM

## 2017-02-28 DIAGNOSIS — E78.2 MIXED DIABETIC HYPERLIPIDEMIA ASSOCIATED WITH TYPE 2 DIABETES MELLITUS (HCC): ICD-10-CM

## 2017-02-28 DIAGNOSIS — E11.69 TYPE 2 DIABETES MELLITUS WITH DIABETIC CARDIOMYOPATHY (HCC): Primary | ICD-10-CM

## 2017-02-28 DIAGNOSIS — N40.0 BENIGN PROSTATIC HYPERPLASIA WITHOUT LOWER URINARY TRACT SYMPTOMS, UNSPECIFIED MORPHOLOGY: ICD-10-CM

## 2017-02-28 DIAGNOSIS — I15.2 HYPERTENSION ASSOCIATED WITH DIABETES (HCC): ICD-10-CM

## 2017-02-28 DIAGNOSIS — E11.69 MIXED DIABETIC HYPERLIPIDEMIA ASSOCIATED WITH TYPE 2 DIABETES MELLITUS (HCC): ICD-10-CM

## 2017-02-28 DIAGNOSIS — E11.59 HYPERTENSION ASSOCIATED WITH DIABETES (HCC): ICD-10-CM

## 2017-02-28 LAB — GLUCOSE BLDC GLUCOMTR-MCNC: 129 MG/DL (ref 70–130)

## 2017-02-28 PROCEDURE — 82962 GLUCOSE BLOOD TEST: CPT | Performed by: INTERNAL MEDICINE

## 2017-02-28 PROCEDURE — 99214 OFFICE O/P EST MOD 30 MIN: CPT | Performed by: INTERNAL MEDICINE

## 2017-02-28 RX ORDER — ERGOCALCIFEROL 1.25 MG/1
50000 CAPSULE ORAL
Qty: 6 CAPSULE | Refills: 11 | Status: SHIPPED | OUTPATIENT
Start: 2017-02-28 | End: 2018-03-14 | Stop reason: SDUPTHER

## 2017-02-28 RX ORDER — VALSARTAN 160 MG/1
160 TABLET ORAL DAILY
Qty: 30 TABLET | Refills: 11 | Status: ON HOLD | OUTPATIENT
Start: 2017-02-28 | End: 2017-04-10

## 2017-02-28 NOTE — PROGRESS NOTES
Erick Antonio is a 66 y.o. male who presents for  evaluation of   Chief Complaint   Patient presents with   • Diabetes         Primary Care / Referring Provider  Willis Green MD    66 y.o.  male comes for follow-up     Reason - Hypogonadism    Duration - 3-14    Quantity - Testosterone 151     Timing - Constant    Severity - High     Alleviating - Aggravating  was on IM testosterone and transdermal - felt better but stopped due to MI in 2-17    Symptoms  he has BPH, symptoms improved with flomax   muscle loss, weakness are present     ==    also T2 DM  constant  Now not controlled  severity moderate  now on treatment    Had MI , they attributed to testosterone but most likely related to the fact that he wasn't on a statin   Now he takes lipitor     Past Medical History   Diagnosis Date   • Arthritis    • Benign essential hypertension    • Coronary arteriosclerosis       nonobstructive CAD      • Dyslipidemia    • Hypercholesterolemia    • Male hypogonadism    • Type 2 diabetes mellitus      Family History   Problem Relation Age of Onset   • Hypertension Other    • Hyperlipidemia Other    • Hypertension Mother    • Cancer Mother    • Heart disease Mother    • Mental illness Mother    • Kidney disease Mother    • Stroke Mother    • Hypertension Father    • Alcohol abuse Father    • Heart disease Father    • Cancer Sister    • Hypertension Brother    • Heart disease Brother    • Diabetes Son      Social History   Substance Use Topics   • Smoking status: Never Smoker   • Smokeless tobacco: Not on file   • Alcohol use No         Current Outpatient Prescriptions:   •  aspirin 81 MG chewable tablet, Chew 1 tablet Daily., Disp: 90 tablet, Rfl: 12  •  atenolol (TENORMIN) 25 MG tablet, Take 25 mg by mouth 2 (Two) Times a Day., Disp: , Rfl:   •  atorvastatin (LIPITOR) 40 MG tablet, Take 1 tablet by mouth Every Night., Disp: 90 tablet, Rfl: 12  •  cefdinir (OMNICEF) 300 MG capsule, Take 300 mg by mouth Every 12 (Twelve)  Hours., Disp: , Rfl:   •  ezetimibe (ZETIA) 10 MG tablet, Take 10 mg by mouth Daily., Disp: , Rfl:   •  glucose blood (FREESTYLE LITE) test strip, 1 each by Other route Daily. Use as instructed, Disp: , Rfl:   •  JANUVIA 100 MG tablet, TAKE ONE TABLET BY MOUTH EVERY DAY, Disp: 30 tablet, Rfl: 0  •  metFORMIN XR (GLUCOPHAGE-XR) 500 MG 24 hr tablet, Take 500 mg by mouth 2 (Two) Times a Day With Meals., Disp: , Rfl:   •  prednisoLONE acetate (PRED FORTE) 1 % ophthalmic suspension, 1 drop 4 (Four) Times a Day. 1 drop in affected eye four times a day., Disp: , Rfl:   •  tamsulosin (FLOMAX) 0.4 MG capsule 24 hr capsule, Take 1 capsule by mouth Daily., Disp: , Rfl:   •  testosterone cypionate (DEPO-TESTOSTERONE) 100 MG/ML solution injection, Inject 1 mL into the shoulder, thigh, or buttocks As Needed (every 10 days). (#3) 18G ,  (#3) 21 G ,  (#3) three ml syringes, Disp: 3 mL, Rfl: 5  •  ticagrelor (BRILINTA) 90 MG tablet tablet, Take 1 tablet by mouth 2 (Two) Times a Day., Disp: 180 tablet, Rfl: 12  •  trifluridine (VIROPTIC) 1 % ophthalmic solution, As Needed., Disp: , Rfl:   •  valsartan-hydrochlorothiazide (DIOVAN-HCT) 160-12.5 MG per tablet, Take 1 tablet by mouth Daily., Disp: , Rfl:   •  vitamin D (ERGOCALCIFEROL) 39331 UNITS capsule capsule, Take 50,000 Units by mouth Every 14 (Fourteen) Days., Disp: , Rfl:     Review of Systems    Review of Systems   Constitutional: Negative for activity change, appetite change, chills, diaphoresis, fatigue, fever and unexpected weight change.   HENT: Negative for congestion, drooling, ear discharge, ear pain, facial swelling, mouth sores, nosebleeds, postnasal drip, rhinorrhea, sinus pressure, sneezing, sore throat, tinnitus, trouble swallowing and voice change.    Eyes: Negative for photophobia, pain, discharge, redness, itching and visual disturbance.   Respiratory: Negative for apnea, cough, choking, chest tightness, shortness of breath, wheezing and stridor.   "  Cardiovascular: Negative for chest pain, palpitations and leg swelling.   Gastrointestinal: Negative for abdominal distention, abdominal pain, anal bleeding, blood in stool, constipation, diarrhea, nausea, rectal pain and vomiting.   Endocrine: Negative for cold intolerance, heat intolerance, polydipsia, polyphagia and polyuria.   Genitourinary: Negative for difficulty urinating, dysuria, enuresis, flank pain, frequency, hematuria and urgency.   Musculoskeletal: Negative for arthralgias, back pain, gait problem, joint swelling, myalgias, neck pain and neck stiffness.   Skin: Negative for color change, pallor and wound.   Allergic/Immunologic: Negative for environmental allergies, food allergies and immunocompromised state.   Neurological: Negative for dizziness, tremors, seizures, syncope, facial asymmetry, speech difficulty, weakness, light-headedness, numbness and headaches.   Hematological: Negative for adenopathy. Does not bruise/bleed easily.   Psychiatric/Behavioral: Negative for agitation, behavioral problems, confusion, decreased concentration, dysphoric mood, hallucinations, self-injury, sleep disturbance and suicidal ideas. The patient is not nervous/anxious and is not hyperactive.         Objective:     Visit Vitals   • /72 (BP Location: Left arm, Patient Position: Sitting, Cuff Size: Large Adult)   • Pulse 61   • Ht 69\" (175.3 cm)   • Wt 172 lb 11.2 oz (78.3 kg)   • BMI 25.5 kg/m2       Physical Exam   Constitutional: He is oriented to person, place, and time. He appears well-developed and well-nourished. He is cooperative.   HENT:   Head: Normocephalic and atraumatic.   Right Ear: External ear normal.   Left Ear: External ear normal.   Nose: Nose normal.   Mouth/Throat: Oropharynx is clear and moist. No oropharyngeal exudate.   Eyes: Conjunctivae and EOM are normal. Pupils are equal, round, and reactive to light. No scleral icterus. Right eye exhibits normal extraocular motion. Left eye exhibits " normal extraocular motion.   Neck: Neck supple. No JVD present. No muscular tenderness present. No tracheal deviation, no edema and no erythema present. No thyromegaly present.   Cardiovascular: Normal rate, regular rhythm, normal heart sounds and intact distal pulses.  Exam reveals no gallop and no friction rub.    No murmur heard.  Pulmonary/Chest: Effort normal and breath sounds normal. No stridor. No respiratory distress. He has no decreased breath sounds. He has no wheezes. He has no rhonchi. He has no rales. He exhibits no tenderness.   Abdominal: Soft. Bowel sounds are normal. He exhibits no distension and no mass. There is no hepatomegaly. There is no tenderness. There is no rebound and no guarding. No hernia.   Musculoskeletal: Normal range of motion. He exhibits no edema, tenderness or deformity.       Vascular Status -  His exam exhibits right foot vasculature normal. His exam exhibits left foot vasculature normal.  Lymphadenopathy:     He has no cervical adenopathy.   Neurological: He is alert and oriented to person, place, and time. He has normal reflexes. No cranial nerve deficit. He exhibits normal muscle tone. Coordination normal.   Skin: Skin is warm. No rash noted. No erythema. No pallor.   Psychiatric: He has a normal mood and affect. His behavior is normal. Judgment and thought content normal.   Nursing note and vitals reviewed.      Lab Review    Results for orders placed or performed in visit on 02/28/17   POC Glucose Fingerstick   Result Value Ref Range    Glucose 129 70 - 130 mg/dL           Assessment/Plan       ICD-10-CM ICD-9-CM   1. Type 2 diabetes mellitus without complication, without long-term current use of insulin E11.9 250.00   2. Diabetic polyneuropathy associated with type 2 diabetes mellitus E11.42 250.60     357.2       Glycemic Management:      Lab Results   Component Value Date    HGBA1C 7.61 (H) 02/01/2017    HGBA1C 6.2 (H) 09/16/2015    HGBA1C 6.7 (H) 06/12/2015     Lab  Results   Component Value Date    LDLCALC 200 (H) 12/02/2014    CREATININE 1.15 02/02/2017         Metformin 500 mg tablets once a day - stop since diarrhea , we may consider in the future     Januvia 100 mg before breakfast     Add jardiance 10 mg before bkfast     Lipid Management    Lab Results   Component Value Date    CHOL 215 (H) 02/02/2017    CHOL 262 (H) 02/01/2017     Lab Results   Component Value Date    TRIG 297 (H) 02/02/2017    TRIG 309 (H) 02/01/2017    TRIG 517 (H) 09/16/2015     Lab Results   Component Value Date    HDL 33 (L) 02/02/2017    HDL 35 (L) 02/01/2017    HDL 32 (L) 09/16/2015     Lab Results   Component Value Date    LDLCALC 200 (H) 12/02/2014       On lipitor 40 mg qhs   Before he couldn't tolerate but now taking since recent MI in 2-17    They are attributing MI to testosterone but most likely related to the fact that he wasn't on statin     Blood Pressure Management  Vitals:    02/28/17 1056   BP: 120/72   Pulse: 61       atenolol 25 mg tablet, 1 tablet(s) by mouth 2 times per day taking   Diovan  mg-12.5 mg tablet, 1 tablet(s) by mouth daily taking - change to diovan 160 mg daily     Immunizations:  Last influenza immunization 2016, Last pneumococcal immunization has had before age 66     Preventive Care:  Patient is not smoking    Weight Related:  Overweight, Counseled on nutrition, Counseled on physical activity    Bone Health  12-14 nl DXA      Lab Results   Component Value Date    EAYI46TA 22.9 (L) 09/16/2015       Restart vit D 50 th u q 2 w       Other Diabetes Related Aspects      Lab Results   Component Value Date    TSH 3.10 09/16/2015     Lab Results   Component Value Date    PQUHHTVD23 507 09/16/2015           --    Hypogonadism     Has bph controlled on flomax     Was on IM testosterone but  stopped due to MI       I reviewed and summarized records from Willis Green MD from 2016 and I reviewed / ordered labs.     Orders Placed This Encounter   Procedures   • POC  Glucose Fingerstick         A copy of my note was sent to Willis Green MD    Please see my above opinion and suggestions.

## 2017-04-07 NOTE — H&P
Cardiology History and Physical Note.        Patient Name: Erick Antonio  Age/Sex: 66 y.o. male  : 1950  MRN: [id number]    Date of Admission : 2017    Primary care Physician: Willis Green MD    Reason for Admission:  Stage PTCA of the left anterior descending artery 70% stenosis      Subjective:       Chief Complaint: Chest pain    History of Present Illness:  Erick Antonio is a 66 y.o. male     There is no height or weight on file to calculate BMI. with a past medical history significant for nonobstructive atherosclerotic coronary artery disease status post aborted lateral wall myocardial infarction on 2017 status post  PTCA and stenting of the first obtuse marginal branch which was 100% occluded 2017, PTCA and stenting of the mid circumflex artery, on previous coronary angiogram done at [facility], history of arterial hypertension, hypertensive heart disease, hyperlipidemia, male hypogonadism, and type 2 diabetes, currently on testosterone supplement with family history for coronary artery disease.  Patient presents for a follow-up after the aborted myocardial infarction on 2017 with 100% occluded second obtuse marginal branch which was intervened and stented with a 2.5 mm x 23 mm Alpine drug-eluting stent and PTCA and stenting of the mid circumflex artery with deployment of a 3 mm x 12 mm Alpine drug-eluting stent.  Patient had a left anterior descending artery stenosis which was not intervened.  Patient has been compliant with his medication.  Patient has had some atypical symptoms of chest pain.  Patient overall has felt well.  Patient denies any fever or chill patient denies any hemoptysis hematuria bright red blood per rectum.  Patient denies any paroxysmal nocturnal dyspnea or orthopnea.    Patient 10 point review of system except for what is stated in the history of present illness is negative.    Past Medical History:    1.  History of atherosclerotic  "coronary artery disease.   2.  Aborted lateral wall myocardial infarction on 2/1/2017 which revealed:             A. 1. 100% occluded second obtuse marginal branch which was the infarct-related vessel.            B. Mid left anterior descending artery with up to 70% stenosis.            C. The second diagonal branch in the proximal portion with 60-70% stenosis to millimeter in caliber.            D. The first obtuse marginal branch is subtotally occluded less than 1 mm in caliber.            E. Posterior descending artery with 60-70% stenosis in the proximal portion.            F. Mid circumflex artery with 60% stenosis after the origin of the second obtuse marginal branch.            G. Preserved left ventricular systolic function with an ejection fraction of 55%.  3.  Rescue PTCA and stenting of the first obtuse marginal branch which was 100% occluded 2/1/2017.   4.  PTCA and stenting of the mid circumflex artery.      3. Nonobstructive atherosclerotic coronary artery disease on previous coronary angiogram done at [facility].   4. Arterial hypertension.  5. Hypertensive heart disease.   6. Hyperlipidemia.  7. Noninsulin dependent diabetes.   8. Vitamin D deficiency.   9. Male hypogonadism.   10. Benign prostatic hypertrophy.   11. Obesity.   12. Anxiety.     Past Surgical History:   Patient had a remote history of trauma to the ankle with surgical intervention. No recent surgery.     Family History:    Premature atherosclerotic coronary artery disease in parents.     Social History:  Denies any tobacco or alcohol intake.      Cardiac Risk factor:     1. Male.  2. Arterial hypertension.  3. Hyperlipidemia.  4. Diabetes.  5. Obesity.  6. Family history for coronary artery disease.      Allergies:  Allergies   Allergen Reactions   • Albuterol Other (See Comments)     \"closes\" him \"down\"   • Prevacid [Lansoprazole] Other (See Comments)     \"chokes him up\"       Medication::  No prescriptions prior to " admission.           Review of Systems:       Constitutional:  Denies recent weight loss, weight gain, fever or chills, no change in exercise tolerance     HENT:  Denies any hearing loss, epistaxis, hoarseness, or difficulty speaking.     Eyes: Wears eyeglasses or contact lenses     Respiratory:  Denies dyspnea with exertion,no cough, wheezing, or hemoptysis.     Cardiovascular: Negative for palpations, chest pain, orthopnea, PND, peripheral edema, syncope, or claudication.     Gastrointestinal:  Denies change in bowel habits, dyspepsia, ulcer disease, hematochezia, or melena.  No nausea, no vomiting, no hematemesis, no diarrhea or constipation, no melena      Endocrine: Negative for cold intolerance, heat intolerance, polydipsia, polyphagia and polyuria. Denies any history of weight change, heat/cold intolerance, polydipsia, polyuria     Genitourinary: Negative hor hematuria.      Musculoskeletal: Denies any history of arthritic symptoms or back problems .  No joint pain, joint stiffness, joint swelling, muscle pain, muscle weakness and neck pain    Skin:  Denies any change in hair or nails, rashes, or skin lesions.     Allergic/Immunologic: Negative.  Negative for environmental allergies, food allergies and immunocompromised state.     Neurological:  Denies any history of recurrent headaches, strokes, TIA, or seizure disorder.     Hematological: Denies excessive bleeding, easy bruising, fatigue, lymphadenopathy and petechiae or any bleeding disorders, or lymphadenopathy.     Psychiatric/Behavioral: Denies any history of depression, substance abuse, or change in cognitive function. Denies any psychomotor reaction or tangential thought.  No depression, homicidal ideations and suicidal ideations    Endocrine: No frequent urination and nocturia, temperature intolerance, weight gain, unintended and weight loss, unintended            Objective:     Objective:  There were no vitals filed for this visit.  .    There is no  height or weight on file to calculate BMI.           Physical Exam:   General Appearance:    Alert, oriented, cooperative, in no acute distress   Head:    Normocephalic, atraumatic, without obvious abnormality   Eyes:           BARBARA  Lids and lashes normal, conjunctivae and sclerae normal, no icterus, no pallor   Ears:    Ears appear intact with no abnormalities noted   Throat:   Mucous membranes pink and moist   Neck:   Supple, trachea midline, no carotid bruit, no organomegaly or JVD   Lungs:     Clear to auscultation and percussion, respirations regular, even and Unlabored. No wheezes, rales, rhonchi    Heart:    Regular rhythm and normal rate, normal S1 and S2, no            murmur, no gallop, no rub, no click   Abdomen:     Soft, non-tender, non-distended, no guarding, no rebound tenderness, Normal bowel sounds in all four quadrant, no masses, liver and spleen nonpalpable,    Genitalia:    Deferred   Extremities:   Moves all extremities well, no edema, no cyanosis, no              Redness, no clubbing   Pulses:   Pulses palpable and equal bilaterally   Skin:   Moist and warm. No bleeding, bruising or rash   Neurologic/Psychiatric:   Alert and oriented to person, place, and time.  Motor, power and tone in upper and lower extremity is grossly intact.  No focal neurological deficits. Normal cognitive function. No psychomotor reaction or tangential thought. No depression, homicidal ideations and suicidal ideations           Lab Review:           Invalid input(s): LABALBU, PROT                                Invalid input(s):  T4,  FREET4    EKG:   ECG/EMG Results (last 24 hours)     ** No results found for the last 24 hours. **          Imaging:  Imaging Results (last 24 hours)     ** No results found for the last 24 hours. **          I personally viewed and interpreted the patient's EKG/Telemetry data.    Assessment:   1.  Chest pain.  Aborted lateral wall myocardial infarction with 70% stenosis in the left  anterior descending artery.  2.  Arterial hypertension.  3.  Hypertensive heart disease.          Plan:   1.  Chest pain atherosclerotic coronary artery disease:.  Patient had sustained a lateral wall myocardial infarction on February 2017 with rescue PTCA and stenting of the mid circumflex artery and the second obtuse marginal branch.  Patient had a posterior descending artery with 60-70% stenosis and a left anterior descending artery with 70% stenosis.  Patient now presents for a follow-up.  Patient has continued to have some symptoms of chest pain.  Due to the patient left anterior descending artery stenosis with ongoing symptoms of chest pain patient has been recommended a stage PTCA of the left anterior descending artery.  Risk-benefit treatment option for the PTCA and stenting of the left anterior descending artery were discussed with the patient and an informed consent was obtained from the patient for the coronary angiogram.  Patient Mallampati score #2 patient ASA classification was #2.  Rest for minimal sedation was also discussed with the patient.  Plan was to proceed with a coronary angiogram on 4/10/2016.  2.  Arterial hypertension.  Patient blood pressure is currently well-controlled patient would be continued on the present dose of the Diovan and atenolol.  3.  Hyperlipidemia.  Patient has elevated triglyceride and has been followed by Dr. George Rojo Patient is currently on Zetia.  Patient would has been started on Lipitor 20 mg at bedtime.  4.  Vitamin D deficiency patient is currently on vitamin D supplement.  5.  Benign prostatic hypertrophy.  Patient has been on Flomax and has been followed by Dr. Green.  6.  Non-insulin-dependent diabetes.  Patient has been counseled on American diabetic Association diet and would be continued on the genitalia after the coronary angiogram.  Patient would be administered Mucomyst.  7.  Male hypergonadism patient is on testosterone supplement and has been  followed by Dr. Rojo      Time: time spent in face-to-face evaluation off greater than 55 minutes and interacting and formulating examining and discussing the plan with the patient with 50% of greater time spent in face-to-face interaction.    Jorge Alvarado MD  04/07/17  5:38 PM      EMR Dragon/Transcription disclaimer:   Some of this note may be an electronic transcription/translation of spoken language to printed text. The electronic translation of spoken language may permit erroneous, or at times, nonsensical words or phrases to be inadvertently transcribed; Although I have reviewed the note for such errors, some may still exist.

## 2017-04-10 ENCOUNTER — HOSPITAL ENCOUNTER (OUTPATIENT)
Facility: HOSPITAL | Age: 67
Setting detail: HOSPITAL OUTPATIENT SURGERY
Discharge: HOME | End: 2017-04-10
Attending: INTERNAL MEDICINE | Admitting: INTERNAL MEDICINE

## 2017-04-10 VITALS
WEIGHT: 174.16 LBS | TEMPERATURE: 97.1 F | HEIGHT: 70 IN | BODY MASS INDEX: 24.93 KG/M2 | RESPIRATION RATE: 20 BRPM | DIASTOLIC BLOOD PRESSURE: 70 MMHG | OXYGEN SATURATION: 96 % | HEART RATE: 54 BPM | SYSTOLIC BLOOD PRESSURE: 126 MMHG

## 2017-04-10 DIAGNOSIS — R07.9 ACUTE CHEST PAIN: ICD-10-CM

## 2017-04-10 LAB
ANION GAP SERPL CALCULATED.3IONS-SCNC: 16 MMOL/L (ref 5–15)
APTT PPP: 26.4 SECONDS (ref 20–40.3)
BASOPHILS # BLD AUTO: 0.02 10*3/MM3 (ref 0–0.2)
BASOPHILS NFR BLD AUTO: 0.3 % (ref 0–2)
BUN BLD-MCNC: 25 MG/DL (ref 7–21)
BUN/CREAT SERPL: 21.7 (ref 7–25)
CALCIUM SPEC-SCNC: 9.5 MG/DL (ref 8.4–10.2)
CHLORIDE SERPL-SCNC: 102 MMOL/L (ref 95–110)
CO2 SERPL-SCNC: 22 MMOL/L (ref 22–31)
CREAT BLD-MCNC: 1.15 MG/DL (ref 0.7–1.3)
DEPRECATED RDW RBC AUTO: 45.2 FL (ref 35.1–43.9)
EOSINOPHIL # BLD AUTO: 0.44 10*3/MM3 (ref 0–0.7)
EOSINOPHIL NFR BLD AUTO: 5.6 % (ref 0–7)
ERYTHROCYTE [DISTWIDTH] IN BLOOD BY AUTOMATED COUNT: 15 % (ref 11.5–14.5)
GFR SERPL CREATININE-BSD FRML MDRD: 64 ML/MIN/1.73 (ref 60–113)
GFR SERPL CREATININE-BSD FRML MDRD: 77 ML/MIN/1.73 (ref 49–113)
GLUCOSE BLD-MCNC: 138 MG/DL (ref 60–100)
HCT VFR BLD AUTO: 43.8 % (ref 39–49)
HGB BLD-MCNC: 15.3 G/DL (ref 13.7–17.3)
IMM GRANULOCYTES # BLD: 0.02 10*3/MM3 (ref 0–0.02)
IMM GRANULOCYTES NFR BLD: 0.3 % (ref 0–0.5)
INR PPP: 1.01 (ref 0.8–1.2)
LYMPHOCYTES # BLD AUTO: 2.03 10*3/MM3 (ref 0.6–4.2)
LYMPHOCYTES NFR BLD AUTO: 25.8 % (ref 10–50)
MCH RBC QN AUTO: 28.9 PG (ref 26.5–34)
MCHC RBC AUTO-ENTMCNC: 34.9 G/DL (ref 31.5–36.3)
MCV RBC AUTO: 82.8 FL (ref 80–98)
MONOCYTES # BLD AUTO: 0.87 10*3/MM3 (ref 0–0.9)
MONOCYTES NFR BLD AUTO: 11.1 % (ref 0–12)
NEUTROPHILS # BLD AUTO: 4.49 10*3/MM3 (ref 2–8.6)
NEUTROPHILS NFR BLD AUTO: 56.9 % (ref 37–80)
PLATELET # BLD AUTO: 214 10*3/MM3 (ref 150–450)
PMV BLD AUTO: 9.6 FL (ref 8–12)
POTASSIUM BLD-SCNC: 4.1 MMOL/L (ref 3.5–5.1)
PROTHROMBIN TIME: 13.2 SECONDS (ref 11.1–15.3)
RBC # BLD AUTO: 5.29 10*6/MM3 (ref 4.37–5.74)
SODIUM BLD-SCNC: 140 MMOL/L (ref 137–145)
WBC NRBC COR # BLD: 7.87 10*3/MM3 (ref 3.2–9.8)

## 2017-04-10 PROCEDURE — 25010000002 ADENOSINE (DIAGNOSTIC) PER 30 MG

## 2017-04-10 PROCEDURE — C1894 INTRO/SHEATH, NON-LASER: HCPCS | Performed by: INTERNAL MEDICINE

## 2017-04-10 PROCEDURE — 93571 IV DOP VEL&/PRESS C FLO 1ST: CPT | Performed by: INTERNAL MEDICINE

## 2017-04-10 PROCEDURE — 93454 CORONARY ARTERY ANGIO S&I: CPT | Performed by: INTERNAL MEDICINE

## 2017-04-10 PROCEDURE — 85730 THROMBOPLASTIN TIME PARTIAL: CPT | Performed by: INTERNAL MEDICINE

## 2017-04-10 PROCEDURE — C1760 CLOSURE DEV, VASC: HCPCS | Performed by: INTERNAL MEDICINE

## 2017-04-10 PROCEDURE — 25010000002 MIDAZOLAM PER 1 MG: Performed by: INTERNAL MEDICINE

## 2017-04-10 PROCEDURE — C1887 CATHETER, GUIDING: HCPCS | Performed by: INTERNAL MEDICINE

## 2017-04-10 PROCEDURE — 80048 BASIC METABOLIC PNL TOTAL CA: CPT | Performed by: INTERNAL MEDICINE

## 2017-04-10 PROCEDURE — 0 IOPAMIDOL PER 1 ML: Performed by: INTERNAL MEDICINE

## 2017-04-10 PROCEDURE — C1769 GUIDE WIRE: HCPCS | Performed by: INTERNAL MEDICINE

## 2017-04-10 PROCEDURE — 85025 COMPLETE CBC W/AUTO DIFF WBC: CPT | Performed by: INTERNAL MEDICINE

## 2017-04-10 PROCEDURE — 85610 PROTHROMBIN TIME: CPT | Performed by: INTERNAL MEDICINE

## 2017-04-10 PROCEDURE — 25010000002 HYDROMORPHONE PER 4 MG: Performed by: INTERNAL MEDICINE

## 2017-04-10 RX ORDER — SODIUM CHLORIDE 450 MG/100ML
75 INJECTION, SOLUTION INTRAVENOUS CONTINUOUS
Status: DISCONTINUED | OUTPATIENT
Start: 2017-04-10 | End: 2017-04-10 | Stop reason: HOSPADM

## 2017-04-10 RX ORDER — LIDOCAINE HYDROCHLORIDE 20 MG/ML
INJECTION, SOLUTION INFILTRATION; PERINEURAL AS NEEDED
Status: DISCONTINUED | OUTPATIENT
Start: 2017-04-10 | End: 2017-04-10 | Stop reason: HOSPADM

## 2017-04-10 RX ORDER — NITROGLYCERIN 0.4 MG/1
0.4 TABLET SUBLINGUAL
COMMUNITY
End: 2023-02-01 | Stop reason: SDUPTHER

## 2017-04-10 RX ORDER — SODIUM CHLORIDE 450 MG/100ML
80 INJECTION, SOLUTION INTRAVENOUS CONTINUOUS
Status: DISCONTINUED | OUTPATIENT
Start: 2017-04-10 | End: 2017-04-10 | Stop reason: HOSPADM

## 2017-04-10 RX ORDER — MIDAZOLAM HYDROCHLORIDE 1 MG/ML
INJECTION INTRAMUSCULAR; INTRAVENOUS AS NEEDED
Status: DISCONTINUED | OUTPATIENT
Start: 2017-04-10 | End: 2017-04-10 | Stop reason: HOSPADM

## 2017-04-10 RX ADMIN — SODIUM CHLORIDE 80 ML/HR: 4.5 INJECTION, SOLUTION INTRAVENOUS at 07:05

## 2017-04-10 NOTE — PLAN OF CARE
Problem: Patient Care Overview (Adult)  Goal: Adult Individualization and Mutuality  Outcome: Outcome(s) achieved Date Met:  04/10/17  Goal: Discharge Needs Assessment  Outcome: Outcome(s) achieved Date Met:  04/10/17    Problem: Cardiac Catheterization with/without PCI (Adult)  Goal: Signs and Symptoms of Listed Potential Problems Will be Absent or Manageable (Cardiac Catheterization with/without PCI)  Outcome: Outcome(s) achieved Date Met:  04/10/17

## 2017-04-10 NOTE — PLAN OF CARE
Problem: Patient Care Overview (Adult)  Goal: Plan of Care Review  Outcome: Outcome(s) achieved Date Met:  04/10/17  Discharge criteria met.

## 2017-08-23 ENCOUNTER — TRANSCRIBE ORDERS (OUTPATIENT)
Dept: NUCLEAR MEDICINE | Facility: HOSPITAL | Age: 67
End: 2017-08-23

## 2017-08-23 DIAGNOSIS — R07.9 CHEST PAIN, UNSPECIFIED: Primary | ICD-10-CM

## 2017-09-01 ENCOUNTER — HOSPITAL ENCOUNTER (OUTPATIENT)
Dept: CARDIOLOGY | Facility: HOSPITAL | Age: 67
Discharge: HOME | End: 2017-09-01
Attending: INTERNAL MEDICINE

## 2017-09-01 ENCOUNTER — HOSPITAL ENCOUNTER (OUTPATIENT)
Dept: NUCLEAR MEDICINE | Facility: HOSPITAL | Age: 67
Discharge: HOME | End: 2017-09-01
Attending: INTERNAL MEDICINE

## 2017-09-01 DIAGNOSIS — R07.9 CHEST PAIN, UNSPECIFIED: ICD-10-CM

## 2017-09-01 PROCEDURE — A9500 TC99M SESTAMIBI: HCPCS | Performed by: INTERNAL MEDICINE

## 2017-09-01 PROCEDURE — 0 TECHNETIUM SESTAMIBI: Performed by: INTERNAL MEDICINE

## 2017-09-01 PROCEDURE — 78452 HT MUSCLE IMAGE SPECT MULT: CPT

## 2017-09-01 PROCEDURE — 25010000002 REGADENOSON 0.4 MG/5ML SOLUTION: Performed by: INTERNAL MEDICINE

## 2017-09-01 PROCEDURE — 93017 CV STRESS TEST TRACING ONLY: CPT

## 2017-09-01 RX ORDER — 0.9 % SODIUM CHLORIDE 0.9 %
10 VIAL (ML) INJECTION AS NEEDED
Status: DISCONTINUED | OUTPATIENT
Start: 2017-09-01 | End: 2017-09-02 | Stop reason: HOSPADM

## 2017-09-01 RX ADMIN — SODIUM CHLORIDE 10 ML: 9 INJECTION INTRAMUSCULAR; INTRAVENOUS; SUBCUTANEOUS at 09:47

## 2017-09-01 RX ADMIN — TECHNETIUM TC-99M SESTAMIBI 1 DOSE: 1 INJECTION INTRAVENOUS at 09:47

## 2017-09-01 RX ADMIN — TECHNETIUM TC-99M SESTAMIBI 1 DOSE: 1 INJECTION INTRAVENOUS at 08:24

## 2017-09-01 RX ADMIN — REGADENOSON 0.4 MG: 0.08 INJECTION, SOLUTION INTRAVENOUS at 09:46

## 2017-09-01 NOTE — H&P
Cardiology History and Physical Note.        Patient Name: Erick Antonio  Age/Sex: 66 y.o. male  : 1950  MRN: [id number]    Date of Admission : 2017    Primary care Physician: Willis Green MD    Reason for Admission:  Chest pain history of atherosclerotic coronary artery disease status post aborted lateral wall myocardial infarction Lexiscan Cardiolite stress test  Subjective:       Chief Complaint:Chest pain shortness of breath     History of Present Illness:  Erick Antonio is a 66 y.o. male     There is no height or weight on file to calculate BMI. with a past medical history significant for nonobstructive atherosclerotic coronary artery disease status post aborted lateral wall myocardial infarction on 2017 status post  PTCA and stenting of the first obtuse marginal branch which was 100% occluded 2017, PTCA and stenting of the mid circumflex artery, on previous coronary angiogram done at [facility], history of arterial hypertension, hypertensive heart disease, hyperlipidemia, male hypogonadism, and type 2 diabetes, currently on testosterone supplement with family history for coronary artery disease.  Patient presents for a follow-up after the aborted myocardial infarction on 2017 with 100% occluded second obtuse marginal branch which was intervened and stented with a 2.5 mm x 23 mm Alpine drug-eluting stent and PTCA and stenting of the mid circumflex artery with deployment of a 3 mm x 12 mm Alpine drug-eluting stent.  Patient had a left anterior descending artery stenosis which was not intervened.     Patient after the aborted lateral wall myocardial infarction in 2017 has not had any symptoms of chest discomfort until recently.  Patient in 2017 had symptoms of chest pain subsequently underwent a coronary angiogram and an FFR evaluation of the left anterior descending artery stenosis which was not hemodynamically significant and patient was  subsequently treated medically.  Patient had symptoms of chest discomfort on and off.  Patient describes the symptoms of chest discomfort which was suggestive of possible angina.        Past Medical History:    1.  History of atherosclerotic coronary artery disease.   2.  Last coronary angiogram in April 2017 had revealed  a.  Sustained patency in the distal circumflex artery and obtuse marginal branch #2 site of previous angioplasty and stenting.  b.  The first obtuse marginal branch distally was a small-caliber vessel with up to 80% stenosis not amenable to percutaneous intervention.  c.  Mid left anterior descending artery with up to 70% stenosis.  d.  The posterior descending artery which was a small-caliber vessel with up to 90% stenosis not amenable to percutaneous intervention.  3.  Aborted lateral wall myocardial infarction on 2/1/2017 which revealed:             A. 100% occluded second obtuse marginal branch which was the infarct-related vessel.            B. Mid left anterior descending artery with up to 70% stenosis.            C. The second diagonal branch in the proximal portion with 60-70% stenosis to millimeter in caliber.            D. The first obtuse marginal branch is subtotally occluded less than 1 mm in caliber.            E. Posterior descending artery with 60-70% stenosis in the proximal portion.            F. Mid circumflex artery with 60% stenosis after the origin of the second obtuse marginal branch.            G. Preserved left ventricular systolic function with an ejection fraction of 55%.  4.  Rescue PTCA and stenting of the first obtuse marginal branch which was 100% occluded 2/1/2017.   5.  PTCA and stenting of the mid circumflex artery.      6. Nonobstructive atherosclerotic coronary artery disease on previous coronary angiogram done at [facility].   7. Arterial hypertension.  8. Hypertensive heart disease.   9. Hyperlipidemia.  10. Noninsulin dependent diabetes.  "  11. Vitamin D deficiency.   12. Male hypogonadism.   13. Benign prostatic hypertrophy.   14. Obesity.   15. Anxiety.           Past Surgical History:     Patient had a remote history of trauma to the ankle with surgical intervention. No recent surgery.         Family History:    Premature atherosclerotic coronary artery disease in parents.    Family History   Problem Relation Age of Onset   • Hypertension Other    • Hyperlipidemia Other    • Hypertension Mother    • Cancer Mother    • Heart disease Mother    • Mental illness Mother    • Kidney disease Mother    • Stroke Mother    • Hypertension Father    • Alcohol abuse Father    • Heart disease Father    • Cancer Sister    • Hypertension Brother    • Heart disease Brother    • Diabetes Son        Social History:     Denies any tobacco or alcohol intake.     Social History     Social History   • Marital status:      Spouse name: N/A   • Number of children: N/A   • Years of education: N/A     Occupational History   • Not on file.     Social History Main Topics   • Smoking status: Never Smoker   • Smokeless tobacco: Not on file   • Alcohol use No   • Drug use: No   • Sexual activity: Yes     Partners: Female     Birth control/ protection: Other      Comment: vasectomy     Other Topics Concern   • Not on file     Social History Narrative        Cardiac Risk factor:   1. Male.  2. Arterial hypertension.  3. Hyperlipidemia.  4. Diabetes.  5. Obesity.  6. Family history for coronary artery disease.     Allergies:  Allergies   Allergen Reactions   • Albuterol Other (See Comments)     \"closes\" him \"down\"   • Cantaloupe (Diagnostic) Swelling     Throat swelling     • Prevacid [Lansoprazole] Other (See Comments)     \"chokes him up\"   • Watermelon Flavor Swelling     Throat swelling       Medication::  Current Outpatient Prescriptions   Medication Sig Dispense Refill   • aspirin 81 MG chewable tablet Chew 1 tablet Daily. 90 tablet 12   • atenolol (TENORMIN) 25 MG " tablet Take 25 mg by mouth 2 (Two) Times a Day.     • atorvastatin (LIPITOR) 40 MG tablet Take 1 tablet by mouth Every Night. 90 tablet 12   • Empagliflozin (JARDIANCE) 10 MG tablet Take 1 tablet by mouth Daily. One tablet daily before breakfast 30 tablet 11   • ezetimibe (ZETIA) 10 MG tablet Take 10 mg by mouth Daily.     • glucose blood (FREESTYLE LITE) test strip 1 each by Other route Daily. Use as instructed     • JANUVIA 100 MG tablet TAKE ONE TABLET BY MOUTH EVERY DAY 30 tablet 0   • nitroglycerin (NITROSTAT) 0.4 MG SL tablet Place 0.4 mg under the tongue Every 5 (Five) Minutes As Needed for Chest Pain. Take no more than 3 doses in 15 minutes.     • prednisoLONE acetate (PRED FORTE) 1 % ophthalmic suspension 1 drop 4 (Four) Times a Day. 1 drop in affected eye four times a day.     • tamsulosin (FLOMAX) 0.4 MG capsule 24 hr capsule Take 1 capsule by mouth Daily.     • trifluridine (VIROPTIC) 1 % ophthalmic solution As Needed.     • vitamin D (ERGOCALCIFEROL) 13490 UNITS capsule capsule Take 1 capsule by mouth Every 14 (Fourteen) Days. 6 capsule 11     No current facility-administered medications for this encounter.            Review of Systems:       Constitutional:  Denies recent weight loss, weight gain, fever or chills, no change in exercise tolerance     HENT:  Denies any hearing loss, epistaxis, hoarseness, or difficulty speaking.     Eyes: Wears eyeglasses or contact lenses     Respiratory:  Denies dyspnea with exertion,no cough, wheezing, or hemoptysis.     Cardiovascular: Negative for chest pain.  Negative for palpitations,  orthopnea, PND, peripheral edema, syncope, or claudication.     Gastrointestinal:  Denies change in bowel habits, dyspepsia, ulcer disease, hematochezia, or melena.  No nausea, no vomiting, no hematemesis, no diarrhea or constipation, no melena      Endocrine: Negative for cold intolerance, heat intolerance, polydipsia, polyphagia and polyuria. Denies any history of weight change,  heat/cold intolerance, polydipsia, polyuria     Genitourinary: Negative for hematuria.      Musculoskeletal: Denies any history of arthritic symptoms or back problems .  No joint pain, joint stiffness, joint swelling, muscle pain, muscle weakness and neck pain    Skin:  Denies any change in hair or nails, rashes, or skin lesions.     Allergic/Immunologic: Negative.  Negative for environmental allergies, food allergies and immunocompromised state.     Neurological:  Denies any history of recurrent headaches, strokes, TIA, or seizure disorder.     Hematological: Denies excessive bleeding, easy bruising, fatigue, lymphadenopathy and petechiae or any bleeding disorders, or lymphadenopathy.     Psychiatric/Behavioral: Denies any history of depression, substance abuse, or change in cognitive function. Denies any psychomotor reaction or tangential thought.  No depression, homicidal ideations and suicidal ideations    Endocrine: No frequent urination and nocturia, temperature intolerance, weight gain, unintended and weight loss, unintended            Objective:     Objective:  There were no vitals filed for this visit.  .    There is no height or weight on file to calculate BMI.           Physical Exam:   General Appearance:    Alert, oriented, cooperative, in no acute distress   Head:    Normocephalic, atraumatic, without obvious abnormality   Eyes:           BARBARA  Lids and lashes normal, conjunctivae and sclerae normal, no icterus, no pallor   Ears:    Ears appear intact with no abnormalities noted   Throat:   Mucous membranes pink and moist   Neck:   Supple, trachea midline, no carotid bruit, no organomegaly or JVD   Lungs:     Clear to auscultation and percussion, respirations regular, even and Unlabored. No wheezes, rales, rhonchi    Heart:    Regular rhythm and normal rate, normal S1 and S2, no            murmur, no gallop, no rub, no click   Abdomen:     Soft, non-tender, non-distended, no guarding, no rebound  tenderness, Normal bowel sounds in all four quadrant, no masses, liver and spleen nonpalpable,    Genitalia:    Deferred   Extremities:   Moves all extremities well, no edema, no cyanosis, no              Redness, no clubbing   Pulses:   Pulses palpable and equal bilaterally   Skin:   Moist and warm. No bleeding, bruising or rash   Neurologic/Psychiatric:   Alert and oriented to person, place, and time.  Motor, power and tone in upper and lower extremity is grossly intact.  No focal neurological deficits. Normal cognitive function. No psychomotor reaction or tangential thought. No depression, homicidal ideations and suicidal ideations           Lab Review:           Invalid input(s): LABALBU, PROT                                Invalid input(s):  T4,  FREET4    EKG:   ECG/EMG Results (last 24 hours)     ** No results found for the last 24 hours. **          Imaging:  Imaging Results (last 24 hours)     ** No results found for the last 24 hours. **          I personally viewed and interpreted the patient's EKG/Telemetry data.    Assessment:   1.  Chest pain.  2.  Atherosclerotic coronary artery disease.  3.  Aborted lateral wall myocardial infarction with subsequent 145 evaluation of the left anterior descending artery.          Plan:   1.  Chest pain atherosclerotic coronary artery disease:.  Patient had sustained a lateral wall myocardial infarction on February 2017 with rescue PTCA and stenting of the mid circumflex artery and the second obtuse marginal branch.  Patient had a posterior descending artery with 60-70% stenosis and a left anterior descending artery with 70% stenosis.  Patient now presents for a follow-up.  Patient has continued to have some symptoms of chest pain.  Due to the patient left anterior descending artery stenosis with ongoing symptoms of chest pain patient has been recommended a stage PTCA of the left anterior descending artery. Patient had presented in April 2017 for a staged PTCA and  underwent an FFR evaluation which was not hemodynamically significant and patient was subsequently treated medically.    patient now presents with recurrent symptoms of chest pain suggestive of angina.  Patient has been recommended to undergo a Lexiscan Cardiolite stress tests to rule out stress-induced ischemia.      2.  Arterial hypertension.  Patient blood pressure is currently well-controlled patient would be continued on the present dose of the Diovan and atenolol.  3.  Hyperlipidemia.  Patient has elevated triglyceride and has been followed by Dr. George Rojo Patient is currently on Zetia.  Patient would has been started on Lipitor 20 mg at bedtime.  4.  Vitamin D deficiency patient is currently on vitamin D supplement.  5.  Benign prostatic hypertrophy.  Patient has been on Flomax and has been followed by Dr. Green.  6.  Non-insulin-dependent diabetes.  Patient has been counseled on American diabetic Association diet and would be continued on the genitalia after the coronary angiogram.  Patient would be administered Mucomyst.  7.  Male hypergonadism patient is on testosterone supplement and has been followed by Dr. Rojo         Time: time spent in face-to-face evaluation of greater than 55 minutes and interacting and formulating examining and discussing the plan with the patient with 50% of greater time spent in face-to-face interaction.    Jorge Alvarado MD  09/01/17  8:24 AM      Dictated utilizing Dragon dictation.

## 2017-09-07 LAB
BH CV STRESS BP STAGE 1: NORMAL
BH CV STRESS COMMENTS STAGE 1: NORMAL
BH CV STRESS DOSE REGADENOSON STAGE 1: 0.4
BH CV STRESS DURATION MIN STAGE 1: 0
BH CV STRESS DURATION SEC STAGE 1: 10
BH CV STRESS HR STAGE 1: 53
BH CV STRESS PROTOCOL 1: NORMAL
BH CV STRESS RECOVERY BP: NORMAL MMHG
BH CV STRESS RECOVERY HR: 72 BPM
BH CV STRESS STAGE 1: 1
LV EF NUC BP: 63 %
MAXIMAL PREDICTED HEART RATE: 155 BPM
PERCENT MAX PREDICTED HR: 52.26 %
STRESS BASELINE BP: NORMAL MMHG
STRESS BASELINE HR: 51 BPM
STRESS PERCENT HR: 61 %
STRESS POST ESTIMATED WORKLOAD: 1 METS
STRESS POST PEAK BP: NORMAL MMHG
STRESS POST PEAK HR: 81 BPM
STRESS TARGET HR: 132 BPM

## 2017-11-13 ENCOUNTER — OFFICE VISIT (OUTPATIENT)
Dept: ENDOCRINOLOGY | Facility: CLINIC | Age: 67
End: 2017-11-13

## 2017-11-13 VITALS
HEIGHT: 70 IN | DIASTOLIC BLOOD PRESSURE: 70 MMHG | SYSTOLIC BLOOD PRESSURE: 129 MMHG | BODY MASS INDEX: 25 KG/M2 | HEART RATE: 81 BPM | WEIGHT: 174.6 LBS

## 2017-11-13 DIAGNOSIS — E11.69 MIXED DIABETIC HYPERLIPIDEMIA ASSOCIATED WITH TYPE 2 DIABETES MELLITUS (HCC): ICD-10-CM

## 2017-11-13 DIAGNOSIS — E29.1 HYPOGONADISM IN MALE: ICD-10-CM

## 2017-11-13 DIAGNOSIS — I15.2 HYPERTENSION ASSOCIATED WITH DIABETES (HCC): ICD-10-CM

## 2017-11-13 DIAGNOSIS — E11.9 TYPE 2 DIABETES MELLITUS WITHOUT COMPLICATION, WITHOUT LONG-TERM CURRENT USE OF INSULIN (HCC): Primary | ICD-10-CM

## 2017-11-13 DIAGNOSIS — E78.2 MIXED DIABETIC HYPERLIPIDEMIA ASSOCIATED WITH TYPE 2 DIABETES MELLITUS (HCC): ICD-10-CM

## 2017-11-13 DIAGNOSIS — E55.9 VITAMIN D DEFICIENCY: ICD-10-CM

## 2017-11-13 DIAGNOSIS — E11.59 HYPERTENSION ASSOCIATED WITH DIABETES (HCC): ICD-10-CM

## 2017-11-13 LAB
GLUCOSE BLDC GLUCOMTR-MCNC: 138 MG/DL (ref 70–130)
HBA1C MFR BLD: 7 %

## 2017-11-13 PROCEDURE — 82962 GLUCOSE BLOOD TEST: CPT | Performed by: INTERNAL MEDICINE

## 2017-11-13 PROCEDURE — 99214 OFFICE O/P EST MOD 30 MIN: CPT | Performed by: INTERNAL MEDICINE

## 2017-11-13 NOTE — PROGRESS NOTES
Erick Antonio is a 67 y.o. male who presents for  evaluation of   Chief Complaint   Patient presents with   • Diabetes         Primary Care / Referring Provider  Willis Green MD    67 y.o.  male comes for follow-up     Reason - Hypogonadism    Duration - 3-14    Quantity - Testosterone 151     Timing - Constant    Severity - High     Alleviating - Aggravating  was on IM testosterone and transdermal - felt better but stopped due to MI in 2-17    Symptoms  he has BPH, symptoms improved with flomax   muscle loss, weakness are present     ==    also T2 DM  constant  Now  controlled  severity moderate  now on treatment    Had MI , they attributed to testosterone but most likely related to the fact that he wasn't on a statin   Now he takes lipitor     Past Medical History:   Diagnosis Date   • Arthritis    • Benign essential hypertension    • Coronary arteriosclerosis      nonobstructive CAD      • Dyslipidemia    • History of shingles     of the eyes   • Hypercholesterolemia    • Male hypogonadism    • Type 2 diabetes mellitus      Family History   Problem Relation Age of Onset   • Hypertension Other    • Hyperlipidemia Other    • Hypertension Mother    • Cancer Mother    • Heart disease Mother    • Mental illness Mother    • Kidney disease Mother    • Stroke Mother    • Hypertension Father    • Alcohol abuse Father    • Heart disease Father    • Cancer Sister    • Hypertension Brother    • Heart disease Brother    • Diabetes Son      Social History   Substance Use Topics   • Smoking status: Never Smoker   • Smokeless tobacco: Never Used   • Alcohol use No         Current Outpatient Prescriptions:   •  aspirin 81 MG chewable tablet, Chew 1 tablet Daily., Disp: 90 tablet, Rfl: 12  •  atenolol (TENORMIN) 25 MG tablet, Take 25 mg by mouth 2 (Two) Times a Day., Disp: , Rfl:   •  atorvastatin (LIPITOR) 40 MG tablet, Take 1 tablet by mouth Every Night., Disp: 90 tablet, Rfl: 12  •  Empagliflozin (JARDIANCE) 10 MG tablet,  Take 1 tablet by mouth Daily. One tablet daily before breakfast, Disp: 30 tablet, Rfl: 11  •  ezetimibe (ZETIA) 10 MG tablet, Take 10 mg by mouth Daily., Disp: , Rfl:   •  glucose blood (FREESTYLE LITE) test strip, 1 each by Other route Daily. Use as instructed, Disp: , Rfl:   •  JANUVIA 100 MG tablet, TAKE ONE TABLET BY MOUTH EVERY DAY, Disp: 30 tablet, Rfl: 0  •  nitroglycerin (NITROSTAT) 0.4 MG SL tablet, Place 0.4 mg under the tongue Every 5 (Five) Minutes As Needed for Chest Pain. Take no more than 3 doses in 15 minutes., Disp: , Rfl:   •  tamsulosin (FLOMAX) 0.4 MG capsule 24 hr capsule, Take 1 capsule by mouth Daily., Disp: , Rfl:   •  trifluridine (VIROPTIC) 1 % ophthalmic solution, As Needed., Disp: , Rfl:   •  vitamin D (ERGOCALCIFEROL) 04536 UNITS capsule capsule, Take 1 capsule by mouth Every 14 (Fourteen) Days., Disp: 6 capsule, Rfl: 11  •  prednisoLONE acetate (PRED FORTE) 1 % ophthalmic suspension, 1 drop 4 (Four) Times a Day. 1 drop in affected eye four times a day., Disp: , Rfl:     Review of Systems    Review of Systems   Constitutional: Negative for activity change, appetite change, chills, diaphoresis, fatigue, fever and unexpected weight change.   HENT: Negative for congestion, drooling, ear discharge, ear pain, facial swelling, mouth sores, nosebleeds, postnasal drip, rhinorrhea, sinus pressure, sneezing, sore throat, tinnitus, trouble swallowing and voice change.    Eyes: Negative for photophobia, pain, discharge, redness, itching and visual disturbance.   Respiratory: Negative for apnea, cough, choking, chest tightness, shortness of breath, wheezing and stridor.    Cardiovascular: Negative for chest pain, palpitations and leg swelling.   Gastrointestinal: Negative for abdominal distention, abdominal pain, anal bleeding, blood in stool, constipation, diarrhea, nausea, rectal pain and vomiting.   Endocrine: Negative for cold intolerance, heat intolerance, polydipsia, polyphagia and polyuria.  "  Genitourinary: Negative for difficulty urinating, dysuria, enuresis, flank pain, frequency, hematuria and urgency.   Musculoskeletal: Negative for arthralgias, back pain, gait problem, joint swelling, myalgias, neck pain and neck stiffness.   Skin: Negative for color change, pallor and wound.   Allergic/Immunologic: Negative for environmental allergies, food allergies and immunocompromised state.   Neurological: Negative for dizziness, tremors, seizures, syncope, facial asymmetry, speech difficulty, weakness, light-headedness, numbness and headaches.   Hematological: Negative for adenopathy. Does not bruise/bleed easily.   Psychiatric/Behavioral: Negative for agitation, behavioral problems, confusion, decreased concentration, dysphoric mood, hallucinations, self-injury, sleep disturbance and suicidal ideas. The patient is not nervous/anxious and is not hyperactive.         Objective:     /70 (BP Location: Left arm, Patient Position: Sitting, Cuff Size: Adult)  Pulse 81  Ht 70\" (177.8 cm)  Wt 174 lb 9.6 oz (79.2 kg)  BMI 25.05 kg/m2    Physical Exam   Constitutional: He is oriented to person, place, and time. He appears well-developed and well-nourished. He is cooperative.   HENT:   Head: Normocephalic and atraumatic.   Right Ear: External ear normal.   Left Ear: External ear normal.   Nose: Nose normal.   Mouth/Throat: Oropharynx is clear and moist. No oropharyngeal exudate.   Eyes: Conjunctivae and EOM are normal. Pupils are equal, round, and reactive to light. No scleral icterus. Right eye exhibits normal extraocular motion. Left eye exhibits normal extraocular motion.   Neck: Neck supple. No JVD present. No muscular tenderness present. No tracheal deviation, no edema and no erythema present. No thyromegaly present.   Cardiovascular: Normal rate, regular rhythm, normal heart sounds and intact distal pulses.  Exam reveals no gallop and no friction rub.    No murmur heard.  Pulmonary/Chest: Effort normal " and breath sounds normal. No stridor. No respiratory distress. He has no decreased breath sounds. He has no wheezes. He has no rhonchi. He has no rales. He exhibits no tenderness.   Abdominal: Soft. Bowel sounds are normal. He exhibits no distension and no mass. There is no hepatomegaly. There is no tenderness. There is no rebound and no guarding. No hernia.   Musculoskeletal: Normal range of motion. He exhibits no edema, tenderness or deformity.       Vascular Status -  His exam exhibits right foot vasculature normal. His exam exhibits left foot vasculature normal.  Lymphadenopathy:     He has no cervical adenopathy.   Neurological: He is alert and oriented to person, place, and time. He has normal reflexes. No cranial nerve deficit. He exhibits normal muscle tone. Coordination normal.   Skin: Skin is warm. No rash noted. No erythema. No pallor.   Psychiatric: He has a normal mood and affect. His behavior is normal. Judgment and thought content normal.   Nursing note and vitals reviewed.      Lab Review    Results for orders placed or performed in visit on 11/13/17   Hemoglobin A1c   Result Value Ref Range    Hemoglobin A1C 7    POC Glucose Fingerstick   Result Value Ref Range    Glucose 138 (A) 70 - 130 mg/dL           Assessment/Plan       ICD-10-CM ICD-9-CM   1. Type 2 diabetes mellitus without complication, without long-term current use of insulin E11.9 250.00   2. Hypertension associated with diabetes E11.59 250.80    I10 401.9   3. Mixed diabetic hyperlipidemia associated with type 2 diabetes mellitus E11.69 250.80    E78.2 272.2   4. Hypogonadism in male E29.1 257.2   5. Vitamin D deficiency E55.9 268.9       Glycemic Management:      Lab Results   Component Value Date    HGBA1C 7 11/06/2017    HGBA1C 7.61 (H) 02/01/2017    HGBA1C 6.2 (H) 09/16/2015     Lab Results   Component Value Date    LDLCALC 200 (H) 12/02/2014    CREATININE 1.15 04/10/2017         Metformin 500 mg tablets once a day - stop since  diarrhea , we may consider in the future     Januvia 100 mg before breakfast     Add jardiance 10 mg before bkfast     Lipid Management    Lab Results   Component Value Date    CHOL 215 (H) 02/02/2017    CHOL 262 (H) 02/01/2017     Lab Results   Component Value Date    TRIG 297 (H) 02/02/2017    TRIG 309 (H) 02/01/2017    TRIG 517 (H) 09/16/2015     Lab Results   Component Value Date    HDL 33 (L) 02/02/2017    HDL 35 (L) 02/01/2017    HDL 32 (L) 09/16/2015     Lab Results   Component Value Date    LDLCALC 200 (H) 12/02/2014     Lab Results   Component Value Date    LDLDIRECT 135 (H) 02/02/2017    LDLDIRECT 186 (H) 02/01/2017       On lipitor 40 mg qhs and zetia 10 mg daily     He is intolerant to crestor as he developed myalgias, he tried this in 2016 and 2017    LDL still elevated despited use of lipitor and zetia       APPROVE FOR REPATHA    Blood Pressure Management  Vitals:    11/13/17 1431   BP: 129/70   Pulse: 81       atenolol 25 mg tablet, 1 tablet(s) by mouth daily     Immunizations:  Last influenza immunization 2016, Last pneumococcal immunization has had before age 66     Preventive Care:  Patient is not smoking    Weight Related:  Overweight, Counseled on nutrition, Counseled on physical activity    Bone Health  12-14 nl DXA      Lab Results   Component Value Date    BLKW84PM 22.9 (L) 09/16/2015        vit D 50 th u q 2 w       Other Diabetes Related Aspects      Lab Results   Component Value Date    TSH 3.10 09/16/2015     Lab Results   Component Value Date    [id number] 507 09/16/2015           --    Hypogonadism     Has bph controlled on flomax     Was on IM testosterone but  stopped due to MI       I reviewed and summarized records from Willis Green MD from 2016 and I reviewed / ordered labs.     Orders Placed This Encounter   Procedures   • Hemoglobin A1c     This order was created through External Result Entry   • POC Glucose Fingerstick         A copy of my note was sent to Willis Green  MD    Please see my above opinion and suggestions.

## 2018-01-02 RX ORDER — EMPAGLIFLOZIN 10 MG/1
TABLET, FILM COATED ORAL
Qty: 90 TABLET | Refills: 0 | Status: SHIPPED | OUTPATIENT
Start: 2018-01-02 | End: 2021-08-16 | Stop reason: SDUPTHER

## 2018-03-02 RX ORDER — ATORVASTATIN CALCIUM 80 MG/1
80 TABLET, FILM COATED ORAL DAILY
Qty: 30 TABLET | Refills: 11 | Status: SHIPPED | OUTPATIENT
Start: 2018-03-02 | End: 2018-07-11

## 2018-03-13 RX ORDER — EMPAGLIFLOZIN 10 MG/1
TABLET, FILM COATED ORAL
Qty: 90 TABLET | Refills: 0 | Status: SHIPPED | OUTPATIENT
Start: 2018-03-13 | End: 2018-03-14

## 2018-03-14 ENCOUNTER — OFFICE VISIT (OUTPATIENT)
Dept: ENDOCRINOLOGY | Facility: CLINIC | Age: 68
End: 2018-03-14

## 2018-03-14 VITALS
WEIGHT: 176.8 LBS | OXYGEN SATURATION: 96 % | DIASTOLIC BLOOD PRESSURE: 76 MMHG | HEART RATE: 80 BPM | HEIGHT: 70 IN | BODY MASS INDEX: 25.31 KG/M2 | SYSTOLIC BLOOD PRESSURE: 128 MMHG

## 2018-03-14 DIAGNOSIS — I25.10 CORONARY ARTERY DISEASE INVOLVING NATIVE HEART WITHOUT ANGINA PECTORIS, UNSPECIFIED VESSEL OR LESION TYPE: ICD-10-CM

## 2018-03-14 DIAGNOSIS — E11.59 HYPERTENSION ASSOCIATED WITH DIABETES (HCC): ICD-10-CM

## 2018-03-14 DIAGNOSIS — I15.2 HYPERTENSION ASSOCIATED WITH DIABETES (HCC): ICD-10-CM

## 2018-03-14 DIAGNOSIS — E11.69 MIXED DIABETIC HYPERLIPIDEMIA ASSOCIATED WITH TYPE 2 DIABETES MELLITUS (HCC): ICD-10-CM

## 2018-03-14 DIAGNOSIS — E11.9 TYPE 2 DIABETES MELLITUS WITHOUT COMPLICATION, WITHOUT LONG-TERM CURRENT USE OF INSULIN (HCC): Primary | ICD-10-CM

## 2018-03-14 DIAGNOSIS — E78.2 MIXED DIABETIC HYPERLIPIDEMIA ASSOCIATED WITH TYPE 2 DIABETES MELLITUS (HCC): ICD-10-CM

## 2018-03-14 PROCEDURE — 82962 GLUCOSE BLOOD TEST: CPT | Performed by: INTERNAL MEDICINE

## 2018-03-14 PROCEDURE — 99214 OFFICE O/P EST MOD 30 MIN: CPT | Performed by: INTERNAL MEDICINE

## 2018-03-14 RX ORDER — ERGOCALCIFEROL 1.25 MG/1
50000 CAPSULE ORAL
Qty: 12 CAPSULE | Refills: 11 | Status: SHIPPED | OUTPATIENT
Start: 2018-03-14 | End: 2019-12-03 | Stop reason: SDUPTHER

## 2018-03-14 NOTE — PROGRESS NOTES
Erick Antonio is a 67 y.o. male who presents for  evaluation of   Chief Complaint   Patient presents with   • Diabetes     3 mo follow up         Primary Care / Referring Provider  Willis Green MD    67 y.o.  male comes for follow-up     Reason - Hypogonadism    Duration - 3-14    Quantity - Testosterone 151     Timing - Constant    Severity - High     Alleviating - Aggravating  was on IM testosterone and transdermal - felt better but stopped due to MI in 2-17    Symptoms  he has BPH, symptoms improved with flomax   muscle loss, weakness are present     ==    also T2 DM  constant  Relatively well controlled   severity moderate  now on treatment    Had MI , they attributed to testosterone but most likely related to the fact that he wasn't on a statin   He can't tolerate statins    Past Medical History:   Diagnosis Date   • Arthritis    • Benign essential hypertension    • Coronary arteriosclerosis      nonobstructive CAD      • Dyslipidemia    • History of shingles     of the eyes   • Hypercholesterolemia    • Male hypogonadism    • Type 2 diabetes mellitus      Family History   Problem Relation Age of Onset   • Hypertension Other    • Hyperlipidemia Other    • Hypertension Mother    • Cancer Mother    • Heart disease Mother    • Mental illness Mother    • Kidney disease Mother    • Stroke Mother    • Hypertension Father    • Alcohol abuse Father    • Heart disease Father    • Cancer Sister    • Hypertension Brother    • Heart disease Brother    • Diabetes Son      Social History   Substance Use Topics   • Smoking status: Never Smoker   • Smokeless tobacco: Never Used   • Alcohol use No         Current Outpatient Prescriptions:   •  aspirin 81 MG chewable tablet, Chew 1 tablet Daily., Disp: 90 tablet, Rfl: 12  •  atenolol (TENORMIN) 25 MG tablet, Take 12.5 mg by mouth Daily., Disp: , Rfl:   •  atorvastatin (LIPITOR) 80 MG tablet, Take 1 tablet by mouth Daily., Disp: 30 tablet, Rfl: 11  •  Evolocumab (REPATHA  SURECLICK) 140 MG/ML solution auto-injector, Inject 140 mg of ampicillin under the skin Every 14 (Fourteen) Days., Disp: 2 pen, Rfl: 11  •  ezetimibe (ZETIA) 10 MG tablet, Take 10 mg by mouth Daily., Disp: , Rfl:   •  glucose blood (FREESTYLE LITE) test strip, 1 each by Other route Daily. Use as instructed, Disp: , Rfl:   •  JANUVIA 100 MG tablet, TAKE ONE TABLET BY MOUTH EVERY DAY, Disp: 30 tablet, Rfl: 0  •  JARDIANCE 10 MG tablet, TAKE ONE TABLET(S) BY MOUTH EVERY DAY BEFORE BREAKFAST., Disp: 90 tablet, Rfl: 0  •  JARDIANCE 10 MG tablet, TAKE ONE TABLET(S) BY MOUTH EVERY DAY BEFORE BREAKFAST., Disp: 90 tablet, Rfl: 0  •  nitroglycerin (NITROSTAT) 0.4 MG SL tablet, Place 0.4 mg under the tongue Every 5 (Five) Minutes As Needed for Chest Pain. Take no more than 3 doses in 15 minutes., Disp: , Rfl:   •  prednisoLONE acetate (PRED FORTE) 1 % ophthalmic suspension, 1 drop 4 (Four) Times a Day. 1 drop in affected eye four times a day., Disp: , Rfl:   •  tamsulosin (FLOMAX) 0.4 MG capsule 24 hr capsule, Take 1 capsule by mouth Daily., Disp: , Rfl:   •  trifluridine (VIROPTIC) 1 % ophthalmic solution, As Needed., Disp: , Rfl:   •  vitamin D (ERGOCALCIFEROL) 15385 UNITS capsule capsule, Take 1 capsule by mouth Every 14 (Fourteen) Days., Disp: 6 capsule, Rfl: 11    Review of Systems    Review of Systems   Constitutional: Negative for activity change, appetite change, chills, diaphoresis, fatigue, fever and unexpected weight change.   HENT: Negative for congestion, drooling, ear discharge, ear pain, facial swelling, mouth sores, nosebleeds, postnasal drip, rhinorrhea, sinus pressure, sneezing, sore throat, tinnitus, trouble swallowing and voice change.    Eyes: Negative for photophobia, pain, discharge, redness, itching and visual disturbance.   Respiratory: Negative for apnea, cough, choking, chest tightness, shortness of breath, wheezing and stridor.    Cardiovascular: Negative for chest pain, palpitations and leg  "swelling.   Gastrointestinal: Negative for abdominal distention, abdominal pain, anal bleeding, blood in stool, constipation, diarrhea, nausea, rectal pain and vomiting.   Endocrine: Negative for cold intolerance, heat intolerance, polydipsia, polyphagia and polyuria.   Genitourinary: Negative for difficulty urinating, dysuria, enuresis, flank pain, frequency, hematuria and urgency.   Musculoskeletal: Negative for arthralgias, back pain, gait problem, joint swelling, myalgias, neck pain and neck stiffness.   Skin: Negative for color change, pallor and wound.   Allergic/Immunologic: Negative for environmental allergies, food allergies and immunocompromised state.   Neurological: Negative for dizziness, tremors, seizures, syncope, facial asymmetry, speech difficulty, weakness, light-headedness, numbness and headaches.   Hematological: Negative for adenopathy. Does not bruise/bleed easily.   Psychiatric/Behavioral: Negative for agitation, behavioral problems, confusion, decreased concentration, dysphoric mood, hallucinations, self-injury, sleep disturbance and suicidal ideas. The patient is not nervous/anxious and is not hyperactive.         Objective:     /76 (BP Location: Left arm, Patient Position: Sitting, Cuff Size: Large Adult)   Pulse 80   Ht 177.8 cm (70\")   Wt 80.2 kg (176 lb 12.8 oz)   SpO2 96%   BMI 25.37 kg/m²     Physical Exam   Constitutional: He is oriented to person, place, and time. He appears well-developed and well-nourished. He is cooperative.   HENT:   Head: Normocephalic and atraumatic.   Right Ear: External ear normal.   Left Ear: External ear normal.   Nose: Nose normal.   Mouth/Throat: Oropharynx is clear and moist. No oropharyngeal exudate.   Eyes: Conjunctivae and EOM are normal. Pupils are equal, round, and reactive to light. No scleral icterus. Right eye exhibits normal extraocular motion. Left eye exhibits normal extraocular motion.   Neck: Neck supple. No JVD present. No " muscular tenderness present. No tracheal deviation, no edema and no erythema present. No thyromegaly present.   Cardiovascular: Normal rate, regular rhythm, normal heart sounds and intact distal pulses.  Exam reveals no gallop and no friction rub.    No murmur heard.  Pulmonary/Chest: Effort normal and breath sounds normal. No stridor. No respiratory distress. He has no decreased breath sounds. He has no wheezes. He has no rhonchi. He has no rales. He exhibits no tenderness.   Abdominal: Soft. Bowel sounds are normal. He exhibits no distension and no mass. There is no hepatomegaly. There is no tenderness. There is no rebound and no guarding. No hernia.   Musculoskeletal: Normal range of motion. He exhibits no edema, tenderness or deformity.     Vascular Status -  His right foot exhibits abnormal right foot vasculature . His left foot exhibits abnormal left foot vasculature .  Lymphadenopathy:     He has no cervical adenopathy.   Neurological: He is alert and oriented to person, place, and time. He has normal reflexes. No cranial nerve deficit. He exhibits normal muscle tone. Coordination normal.   Skin: Skin is warm. No rash noted. No erythema. No pallor.   Psychiatric: He has a normal mood and affect. His behavior is normal. Judgment and thought content normal.   Nursing note and vitals reviewed.      Lab Review    Results for orders placed or performed in visit on 11/13/17   Hemoglobin A1c   Result Value Ref Range    Hemoglobin A1C 7    POC Glucose Fingerstick   Result Value Ref Range    Glucose 138 (A) 70 - 130 mg/dL           Assessment/Plan     No diagnosis found.    Glycemic Management:      Lab Results   Component Value Date    HGBA1C 7 11/06/2017    HGBA1C 7.61 (H) 02/01/2017    HGBA1C 6.2 (H) 09/16/2015     Lab Results   Component Value Date    CREATININE 1.15 04/10/2017         Metformin 500 mg tablets once a day - stop since diarrhea , we may consider in the future     Januvia 100 mg before breakfast -  change to ozempic 0.5 mg weekly since CV disease    Add jardiance 10 mg before bkfast     Lipid Management        Was opn  lipitor 40 mg qhs and zetia 10 mg daily     He is intolerant to crestor as he developed myalgias, he tried this in 2016 and 2017    Since last appt his dose was raised to lipitor 80 mg qhs and zetia 10 mg daily     Despite that dose increase, these are the labs :    March 5, 2018    Tot Cholesterol 284  LDL cholesterol 181  HDL 41  Triglycerides 309    In summary , he has failed 80 mg of lipitor qhs and 10 mg of zetia 10 mg daily   APPROVE FOR REPATHA    Blood Pressure Management  Vitals:    03/14/18 1537   BP: 128/76   Pulse: 80   SpO2: 96%       atenolol 25 mg tablet, 1 tablet(s) by mouth daily     Immunizations:  Last influenza immunization 2016, Last pneumococcal immunization has had before age 66     Preventive Care:  Patient is not smoking    Weight Related:  Overweight, Counseled on nutrition, Counseled on physical activity    Bone Health  12-14 nl DXA         vit D 50 th u q 2 w     March 2018, 25, increase to weekly       Other Diabetes Related Aspects      March 2018    tsH 3.39  b12     --    Hypogonadism     Has bph controlled on flomax     Was on IM testosterone but  stopped due to MI       I reviewed and summarized records from Willis Green MD from 2016 and I reviewed / ordered labs.     No orders of the defined types were placed in this encounter.        A copy of my note was sent to Willis Green MD    Please see my above opinion and suggestions.

## 2018-03-19 LAB — GLUCOSE BLDC GLUCOMTR-MCNC: 126 MG/DL (ref 70–130)

## 2018-03-23 ENCOUNTER — TELEPHONE (OUTPATIENT)
Dept: ENDOCRINOLOGY | Facility: CLINIC | Age: 68
End: 2018-03-23

## 2018-03-23 NOTE — TELEPHONE ENCOUNTER
Outcome   Unknown   Next Steps   The plan will fax you a determination, typically within 1 to 5 business days.  How do I follow up?   DrugOzempic 0.25 or 0.5MG/DOSE pen-injectors   [employer] Exception to Coverage Request FormException to Coverage [phone] [phone]   Original Claim Info70

## 2018-03-30 ENCOUNTER — TELEPHONE (OUTPATIENT)
Dept: FAMILY MEDICINE CLINIC | Facility: CLINIC | Age: 68
End: 2018-03-30

## 2018-07-11 ENCOUNTER — OFFICE VISIT (OUTPATIENT)
Dept: ENDOCRINOLOGY | Facility: CLINIC | Age: 68
End: 2018-07-11

## 2018-07-11 VITALS
HEART RATE: 56 BPM | DIASTOLIC BLOOD PRESSURE: 74 MMHG | BODY MASS INDEX: 24.94 KG/M2 | SYSTOLIC BLOOD PRESSURE: 128 MMHG | WEIGHT: 174.2 LBS | HEIGHT: 70 IN | OXYGEN SATURATION: 97 %

## 2018-07-11 DIAGNOSIS — E29.1 HYPOGONADISM IN MALE: ICD-10-CM

## 2018-07-11 DIAGNOSIS — I25.10 CORONARY ARTERY DISEASE INVOLVING NATIVE HEART WITHOUT ANGINA PECTORIS, UNSPECIFIED VESSEL OR LESION TYPE: ICD-10-CM

## 2018-07-11 DIAGNOSIS — E55.9 VITAMIN D DEFICIENCY: ICD-10-CM

## 2018-07-11 DIAGNOSIS — E78.2 MIXED DIABETIC HYPERLIPIDEMIA ASSOCIATED WITH TYPE 2 DIABETES MELLITUS (HCC): ICD-10-CM

## 2018-07-11 DIAGNOSIS — E11.9 TYPE 2 DIABETES MELLITUS WITHOUT COMPLICATION, WITHOUT LONG-TERM CURRENT USE OF INSULIN (HCC): Primary | ICD-10-CM

## 2018-07-11 DIAGNOSIS — I15.2 HYPERTENSION ASSOCIATED WITH DIABETES (HCC): ICD-10-CM

## 2018-07-11 DIAGNOSIS — E11.59 HYPERTENSION ASSOCIATED WITH DIABETES (HCC): ICD-10-CM

## 2018-07-11 DIAGNOSIS — E11.69 MIXED DIABETIC HYPERLIPIDEMIA ASSOCIATED WITH TYPE 2 DIABETES MELLITUS (HCC): ICD-10-CM

## 2018-07-11 LAB — GLUCOSE BLDC GLUCOMTR-MCNC: 118 MG/DL (ref 70–130)

## 2018-07-11 PROCEDURE — 82962 GLUCOSE BLOOD TEST: CPT | Performed by: INTERNAL MEDICINE

## 2018-07-11 PROCEDURE — 99214 OFFICE O/P EST MOD 30 MIN: CPT | Performed by: INTERNAL MEDICINE

## 2018-07-11 NOTE — PROGRESS NOTES
Erick Antonio is a 67 y.o. male who presents for  evaluation of   Chief Complaint   Patient presents with   • Diabetes         Primary Care / Referring Provider  Willis Green MD    67 y.o.    ==    also T2 DM  constant   well controlled   severity moderate      Had MI , they attributed to testosterone but most likely related to the fact that he wasn't on a statin   He can't tolerate statins but now on repatha and LDL at goal     Past Medical History:   Diagnosis Date   • Arthritis    • Benign essential hypertension    • Coronary arteriosclerosis      nonobstructive CAD      • Dyslipidemia    • History of shingles     of the eyes   • Hypercholesterolemia    • Male hypogonadism    • Type 2 diabetes mellitus (CMS/HCC)      Family History   Problem Relation Age of Onset   • Hypertension Other    • Hyperlipidemia Other    • Hypertension Mother    • Cancer Mother    • Heart disease Mother    • Mental illness Mother    • Kidney disease Mother    • Stroke Mother    • Hypertension Father    • Alcohol abuse Father    • Heart disease Father    • Cancer Sister    • Hypertension Brother    • Heart disease Brother    • Diabetes Son      Social History   Substance Use Topics   • Smoking status: Never Smoker   • Smokeless tobacco: Never Used   • Alcohol use No         Current Outpatient Prescriptions:   •  aspirin 81 MG chewable tablet, Chew 1 tablet Daily., Disp: 90 tablet, Rfl: 12  •  atenolol (TENORMIN) 25 MG tablet, Take 12.5 mg by mouth Daily., Disp: , Rfl:   •  atorvastatin (LIPITOR) 80 MG tablet, Take 1 tablet by mouth Daily., Disp: 30 tablet, Rfl: 11  •  Evolocumab (REPATHA SURECLICK) 140 MG/ML solution auto-injector, Inject 140 mg of ampicillin under the skin Every 14 (Fourteen) Days., Disp: 2 pen, Rfl: 11  •  ezetimibe (ZETIA) 10 MG tablet, Take 10 mg by mouth Daily., Disp: , Rfl:   •  glucose blood (FREESTYLE LITE) test strip, 1 each by Other route Daily. Use as instructed, Disp: , Rfl:   •  JARDIANCE 10 MG tablet,  TAKE ONE TABLET(S) BY MOUTH EVERY DAY BEFORE BREAKFAST., Disp: 90 tablet, Rfl: 0  •  nitroglycerin (NITROSTAT) 0.4 MG SL tablet, Place 0.4 mg under the tongue Every 5 (Five) Minutes As Needed for Chest Pain. Take no more than 3 doses in 15 minutes., Disp: , Rfl:   •  prednisoLONE acetate (PRED FORTE) 1 % ophthalmic suspension, 1 drop 4 (Four) Times a Day. 1 drop in affected eye four times a day., Disp: , Rfl:   •  SITagliptin (JANUVIA) 100 MG tablet, Take 100 mg by mouth Daily., Disp: , Rfl:   •  tamsulosin (FLOMAX) 0.4 MG capsule 24 hr capsule, Take 1 capsule by mouth Daily., Disp: , Rfl:   •  trifluridine (VIROPTIC) 1 % ophthalmic solution, As Needed., Disp: , Rfl:   •  vitamin D (ERGOCALCIFEROL) 11716 units capsule capsule, Take 1 capsule by mouth Every 7 (Seven) Days., Disp: 12 capsule, Rfl: 11    Review of Systems    Review of Systems   Constitutional: Negative for activity change, appetite change, chills, diaphoresis, fatigue, fever and unexpected weight change.   HENT: Negative for congestion, drooling, ear discharge, ear pain, facial swelling, mouth sores, nosebleeds, postnasal drip, rhinorrhea, sinus pressure, sneezing, sore throat, tinnitus, trouble swallowing and voice change.    Eyes: Negative for photophobia, pain, discharge, redness, itching and visual disturbance.   Respiratory: Negative for apnea, cough, choking, chest tightness, shortness of breath, wheezing and stridor.    Cardiovascular: Negative for chest pain, palpitations and leg swelling.   Gastrointestinal: Negative for abdominal distention, abdominal pain, anal bleeding, blood in stool, constipation, diarrhea, nausea, rectal pain and vomiting.   Endocrine: Negative for cold intolerance, heat intolerance, polydipsia, polyphagia and polyuria.   Genitourinary: Negative for difficulty urinating, dysuria, enuresis, flank pain, frequency, hematuria and urgency.   Musculoskeletal: Negative for arthralgias, back pain, gait problem, joint swelling,  "myalgias, neck pain and neck stiffness.   Skin: Negative for color change, pallor and wound.   Allergic/Immunologic: Negative for environmental allergies, food allergies and immunocompromised state.   Neurological: Negative for dizziness, tremors, seizures, syncope, facial asymmetry, speech difficulty, weakness, light-headedness, numbness and headaches.   Hematological: Negative for adenopathy. Does not bruise/bleed easily.   Psychiatric/Behavioral: Negative for agitation, behavioral problems, confusion, decreased concentration, dysphoric mood, hallucinations, self-injury, sleep disturbance and suicidal ideas. The patient is not nervous/anxious and is not hyperactive.         Objective:     /74 (BP Location: Left arm, Patient Position: Sitting, Cuff Size: Large Adult)   Pulse 56   Ht 177.8 cm (70\")   Wt 79 kg (174 lb 3.2 oz)   SpO2 97%   BMI 25.00 kg/m²     Physical Exam   Constitutional: He is oriented to person, place, and time. He appears well-developed and well-nourished. He is cooperative.   HENT:   Head: Normocephalic and atraumatic.   Right Ear: External ear normal.   Left Ear: External ear normal.   Nose: Nose normal.   Mouth/Throat: Oropharynx is clear and moist. No oropharyngeal exudate.   Eyes: Conjunctivae and EOM are normal. Pupils are equal, round, and reactive to light. No scleral icterus. Right eye exhibits normal extraocular motion. Left eye exhibits normal extraocular motion.   Neck: Neck supple. No JVD present. No muscular tenderness present. No tracheal deviation, no edema and no erythema present. No thyromegaly present.   Cardiovascular: Normal rate, regular rhythm, normal heart sounds and intact distal pulses.  Exam reveals no gallop and no friction rub.    No murmur heard.  Pulmonary/Chest: Effort normal and breath sounds normal. No stridor. No respiratory distress. He has no decreased breath sounds. He has no wheezes. He has no rhonchi. He has no rales. He exhibits no tenderness. "   Abdominal: Soft. Bowel sounds are normal. He exhibits no distension and no mass. There is no hepatomegaly. There is no tenderness. There is no rebound and no guarding. No hernia.   Musculoskeletal: Normal range of motion. He exhibits no edema, tenderness or deformity.     Vascular Status -  His right foot exhibits normal foot vasculature . His left foot exhibits normal foot vasculature .  Lymphadenopathy:     He has no cervical adenopathy.   Neurological: He is alert and oriented to person, place, and time. He has normal reflexes. No cranial nerve deficit. He exhibits normal muscle tone. Coordination normal.   Skin: Skin is warm. No rash noted. No erythema. No pallor.   Psychiatric: He has a normal mood and affect. His behavior is normal. Judgment and thought content normal.   Nursing note and vitals reviewed.      Lab Review    Results for orders placed or performed in visit on 07/11/18   POC Glucose   Result Value Ref Range    Glucose 118 70 - 130 mg/dL           Assessment/Plan       ICD-10-CM ICD-9-CM   1. Type 2 diabetes mellitus without complication, without long-term current use of insulin (CMS/[facility]) E11.9 250.00   2. Hypertension associated with diabetes (CMS/HCC) E11.59 250.80    I10 401.9   3. Mixed diabetic hyperlipidemia associated with type 2 diabetes mellitus (CMS/[facility]) E11.69 250.80    E78.2 272.2   4. Coronary artery disease involving native heart without angina pectoris, unspecified vessel or lesion type I25.10 414.01   5. Hypogonadism in male E29.1 257.2   6. Vitamin D deficiency E55.9 268.9       Glycemic Management:      Lab Results   Component Value Date    HGBA1C 7 11/06/2017    HGBA1C 7.61 (H) 02/01/2017    HGBA1C 6.2 (H) 09/16/2015     Lab Results   Component Value Date    CREATININE 1.15 04/10/2017         Metformin 500 mg tablets once a day - stop since diarrhea , we may consider in the future     Januvia 100 mg before breakfast - change to ozempic 0.5 mg weekly since CV disease  Dr. Green  changed back to Januvia  Aic is 6.9 so effective glycemic control    Nevertheless, Januvia doesn't have CV benefits.   Ozempic , on the other hand, decreases stroke and MI in patients with CV disease.  Why wouldn't we want this benefit ?    Jardiance 10 mg before bkfast   gfr 42, focus on hydration   Lipid Management        Was on  lipitor 80 mg qhs and zetia 10 mg daily     March 5, 2018    Tot Cholesterol 284  LDL cholesterol 181  HDL 41  Triglycerides 309    Now on repatha and LDL52     Blood Pressure Management  Vitals:    07/11/18 0910   BP: 128/74   Pulse: 56   SpO2: 97%       atenolol 25 mg tablet, 1 tablet(s) by mouth daily     Immunizations:  Last influenza immunization 2016, Last pneumococcal immunization has had before age 66     Preventive Care:  Patient is not smoking    Weight Related:  Overweight, Counseled on nutrition, Counseled on physical activity    Bone Health  12-14 nl DXA         vit D 50 th u q 2 w     March 2018, 25, increase to weekly       Other Diabetes Related Aspects      March 2018    tsH 3.39  b12     --    Hypogonadism     Has bph controlled on flomax     Was on IM testosterone but  stopped due to MI       I reviewed and summarized records from Willis Green MD from 2016 and I reviewed / ordered labs.     Orders Placed This Encounter   Procedures   • POC Glucose         A copy of my note was sent to Willis Green MD    Please see my above opinion and suggestions.

## 2019-01-03 ENCOUNTER — TELEPHONE (OUTPATIENT)
Dept: FAMILY MEDICINE CLINIC | Facility: CLINIC | Age: 69
End: 2019-01-03

## 2019-01-03 NOTE — TELEPHONE ENCOUNTER
DR NORRIS/DULCE MARIA'S OFFICE CALLED FOR KASSIDY SALAZAR..he IS WANTING TO GET HIS LABS DONE THERE AS HE HAS A F/U APPT HERE 1/16..THEY NEED ORDERS TO BE FX THERE= [phone]

## 2019-01-16 ENCOUNTER — OFFICE VISIT (OUTPATIENT)
Dept: ENDOCRINOLOGY | Facility: CLINIC | Age: 69
End: 2019-01-16

## 2019-01-16 VITALS
DIASTOLIC BLOOD PRESSURE: 78 MMHG | OXYGEN SATURATION: 96 % | HEART RATE: 82 BPM | WEIGHT: 176.9 LBS | BODY MASS INDEX: 25.33 KG/M2 | HEIGHT: 70 IN | SYSTOLIC BLOOD PRESSURE: 132 MMHG

## 2019-01-16 DIAGNOSIS — I25.10 CORONARY ARTERY DISEASE INVOLVING NATIVE HEART WITHOUT ANGINA PECTORIS, UNSPECIFIED VESSEL OR LESION TYPE: ICD-10-CM

## 2019-01-16 DIAGNOSIS — I15.2 HYPERTENSION ASSOCIATED WITH DIABETES (HCC): ICD-10-CM

## 2019-01-16 DIAGNOSIS — E11.9 TYPE 2 DIABETES MELLITUS WITHOUT COMPLICATION, WITHOUT LONG-TERM CURRENT USE OF INSULIN (HCC): Primary | ICD-10-CM

## 2019-01-16 DIAGNOSIS — E29.1 HYPOGONADISM IN MALE: ICD-10-CM

## 2019-01-16 DIAGNOSIS — E11.59 HYPERTENSION ASSOCIATED WITH DIABETES (HCC): ICD-10-CM

## 2019-01-16 PROCEDURE — 99214 OFFICE O/P EST MOD 30 MIN: CPT | Performed by: INTERNAL MEDICINE

## 2019-01-16 RX ORDER — GABAPENTIN 100 MG/1
200 CAPSULE ORAL
Qty: 60 CAPSULE | Refills: 5 | Status: SHIPPED | OUTPATIENT
Start: 2019-01-16 | End: 2019-05-15

## 2019-01-16 NOTE — PROGRESS NOTES
Erick Antonio is a 68 y.o. male who presents for  evaluation of   Chief Complaint   Patient presents with   • Diabetes     bs 140         Primary Care / Referring Provider  Willis Green MD    68 y.o.    ==    also T2 DM  constant   well controlled   severity moderate      Had MI , they attributed to testosterone but most likely related to the fact that he wasn't on a statin   He can't tolerate statins but now on repatha and LDL at goal     Past Medical History:   Diagnosis Date   • Arthritis    • Benign essential hypertension    • Coronary arteriosclerosis      nonobstructive CAD      • Dyslipidemia    • History of shingles     of the eyes   • Hypercholesterolemia    • Male hypogonadism    • Type 2 diabetes mellitus (CMS/HCC)      Family History   Problem Relation Age of Onset   • Hypertension Other    • Hyperlipidemia Other    • Hypertension Mother    • Cancer Mother    • Heart disease Mother    • Mental illness Mother    • Kidney disease Mother    • Stroke Mother    • Hypertension Father    • Alcohol abuse Father    • Heart disease Father    • Cancer Sister    • Hypertension Brother    • Heart disease Brother    • Diabetes Son      Social History     Tobacco Use   • Smoking status: Never Smoker   • Smokeless tobacco: Never Used   Substance Use Topics   • Alcohol use: No   • Drug use: No         Current Outpatient Medications:   •  aspirin 81 MG chewable tablet, Chew 1 tablet Daily., Disp: 90 tablet, Rfl: 12  •  atenolol (TENORMIN) 25 MG tablet, Take 12.5 mg by mouth Daily., Disp: , Rfl:   •  Evolocumab (REPATHA SURECLICK) 140 MG/ML solution auto-injector, Inject 140 mg of ampicillin under the skin Every 14 (Fourteen) Days., Disp: 2 pen, Rfl: 11  •  glucose blood (FREESTYLE LITE) test strip, 1 each by Other route Daily. Use as instructed, Disp: , Rfl:   •  JARDIANCE 10 MG tablet, TAKE ONE TABLET(S) BY MOUTH EVERY DAY BEFORE BREAKFAST., Disp: 90 tablet, Rfl: 0  •  nitroglycerin (NITROSTAT) 0.4 MG SL tablet,  Place 0.4 mg under the tongue Every 5 (Five) Minutes As Needed for Chest Pain. Take no more than 3 doses in 15 minutes., Disp: , Rfl:   •  prednisoLONE acetate (PRED FORTE) 1 % ophthalmic suspension, 1 drop 4 (Four) Times a Day. 1 drop in affected eye four times a day., Disp: , Rfl:   •  SITagliptin (JANUVIA) 100 MG tablet, Take 100 mg by mouth Daily., Disp: , Rfl:   •  tamsulosin (FLOMAX) 0.4 MG capsule 24 hr capsule, Take 1 capsule by mouth Daily., Disp: , Rfl:   •  trifluridine (VIROPTIC) 1 % ophthalmic solution, As Needed., Disp: , Rfl:   •  vitamin D (ERGOCALCIFEROL) 36097 units capsule capsule, Take 1 capsule by mouth Every 7 (Seven) Days., Disp: 12 capsule, Rfl: 11    Review of Systems    Review of Systems   Constitutional: Negative for activity change, appetite change, chills, diaphoresis, fatigue, fever and unexpected weight change.   HENT: Negative for congestion, drooling, ear discharge, ear pain, facial swelling, mouth sores, nosebleeds, postnasal drip, rhinorrhea, sinus pressure, sneezing, sore throat, tinnitus, trouble swallowing and voice change.    Eyes: Negative for photophobia, pain, discharge, redness, itching and visual disturbance.   Respiratory: Negative for apnea, cough, choking, chest tightness, shortness of breath, wheezing and stridor.    Cardiovascular: Negative for chest pain, palpitations and leg swelling.   Gastrointestinal: Negative for abdominal distention, abdominal pain, anal bleeding, blood in stool, constipation, diarrhea, nausea, rectal pain and vomiting.   Endocrine: Negative for cold intolerance, heat intolerance, polydipsia, polyphagia and polyuria.   Genitourinary: Negative for difficulty urinating, dysuria, enuresis, flank pain, frequency, hematuria and urgency.   Musculoskeletal: Negative for arthralgias, back pain, gait problem, joint swelling, myalgias, neck pain and neck stiffness.   Skin: Negative for color change, pallor and wound.   Allergic/Immunologic: Negative for  "environmental allergies, food allergies and immunocompromised state.   Neurological: Negative for dizziness, tremors, seizures, syncope, facial asymmetry, speech difficulty, weakness, light-headedness, numbness and headaches.   Hematological: Negative for adenopathy. Does not bruise/bleed easily.   Psychiatric/Behavioral: Negative for agitation, behavioral problems, confusion, decreased concentration, dysphoric mood, hallucinations, self-injury, sleep disturbance and suicidal ideas. The patient is not nervous/anxious and is not hyperactive.         Objective:     /78   Pulse 82   Ht 177.8 cm (70\")   Wt 80.2 kg (176 lb 14.4 oz)   SpO2 96%   BMI 25.38 kg/m²     Physical Exam   Constitutional: He is oriented to person, place, and time. He appears well-developed and well-nourished. He is cooperative.   HENT:   Head: Normocephalic and atraumatic.   Right Ear: External ear normal.   Left Ear: External ear normal.   Nose: Nose normal.   Mouth/Throat: Oropharynx is clear and moist. No oropharyngeal exudate.   Eyes: Conjunctivae and EOM are normal. Pupils are equal, round, and reactive to light. No scleral icterus. Right eye exhibits normal extraocular motion. Left eye exhibits normal extraocular motion.   Neck: Neck supple. No JVD present. No muscular tenderness present. No tracheal deviation, no edema and no erythema present. No thyromegaly present.   Cardiovascular: Normal rate, regular rhythm, normal heart sounds and intact distal pulses. Exam reveals no gallop and no friction rub.   No murmur heard.  Pulmonary/Chest: Effort normal and breath sounds normal. No stridor. No respiratory distress. He has no decreased breath sounds. He has no wheezes. He has no rhonchi. He has no rales. He exhibits no tenderness.   Abdominal: Soft. Bowel sounds are normal. He exhibits no distension and no mass. There is no hepatomegaly. There is no tenderness. There is no rebound and no guarding. No hernia.   Musculoskeletal: " Normal range of motion. He exhibits no edema, tenderness or deformity.     Vascular Status -  His right foot exhibits normal foot vasculature . His left foot exhibits normal foot vasculature .  Lymphadenopathy:     He has no cervical adenopathy.   Neurological: He is alert and oriented to person, place, and time. He has normal reflexes. No cranial nerve deficit. He exhibits normal muscle tone. Coordination normal.   Skin: Skin is warm. No rash noted. No erythema. No pallor.   Psychiatric: He has a normal mood and affect. His behavior is normal. Judgment and thought content normal.   Nursing note and vitals reviewed.      Lab Review    Results for orders placed or performed in visit on 07/11/18   POC Glucose   Result Value Ref Range    Glucose 118 70 - 130 mg/dL           Assessment/Plan       ICD-10-CM ICD-9-CM   1. Type 2 diabetes mellitus without complication, without long-term current use of insulin (CMS/[facility]) E11.9 250.00   2. Hypertension associated with diabetes (CMS/HCC) E11.59 250.80    I10 401.9   3. Coronary artery disease involving native heart without angina pectoris, unspecified vessel or lesion type I25.10 414.01   4. Hypogonadism in male E29.1 257.2       Glycemic Management:      Lab Results   Component Value Date    HGBA1C 7 11/06/2017    HGBA1C 7.61 (H) 02/01/2017    HGBA1C 6.2 (H) 09/16/2015     Lab Results   Component Value Date    CREATININE 1.15 04/10/2017         Metformin 500 mg tablets once a day - stop since diarrhea , we may consider in the future     Januvia 100 mg before breakfast - change to ozempic 0.5 mg weekly since CV disease  Dr. Green changed back to Januvia  Aic 7.7 --- change back to Ozempic     Nevertheless, Januvia doesn't have CV benefits.   Ozempic , on the other hand, decreases stroke and MI in patients with CV disease.  Why wouldn't we want this benefit ?    Jardiance 10 mg before bkfast   gfr 42, focus on hydration     Neuropathy - neurontin 200 mg qhs   Lipid  Management        Was on  lipitor 80 mg qhs and zetia 10 mg daily     March 5, 2018    Tot Cholesterol 284  LDL cholesterol 181  HDL 41  Triglycerides 309    Now on repatha and LDL52 but it donavon to 133     Keep repatha     Blood Pressure Management  Vitals:    01/16/19 0934   BP: 132/78   Pulse: 82   SpO2: 96%       atenolol 25 mg tablet, 1 tablet(s) by mouth daily     Immunizations:  Last influenza immunization 2016, Last pneumococcal immunization has had before age 66     Preventive Care:  Patient is not smoking    Weight Related:  Overweight, Counseled on nutrition, Counseled on physical activity    Bone Health  12-14 nl DXA         vit D 50 th u q 2 w     March 2018, 25, increase to weekly       Other Diabetes Related Aspects      March 2018    tsH 3.39  b12     --    Hypogonadism     Has bph controlled on flomax     Was on IM testosterone but  stopped due to MI       I reviewed and summarized records from Willis Green MD from 2016 and I reviewed / ordered labs.     No orders of the defined types were placed in this encounter.        A copy of my note was sent to Willis Green MD    Please see my above opinion and suggestions.

## 2019-01-23 ENCOUNTER — TELEPHONE (OUTPATIENT)
Dept: FAMILY MEDICINE CLINIC | Facility: CLINIC | Age: 69
End: 2019-01-23

## 2019-02-19 ENCOUNTER — TELEPHONE (OUTPATIENT)
Dept: FAMILY MEDICINE CLINIC | Facility: CLINIC | Age: 69
End: 2019-02-19

## 2019-02-19 RX ORDER — ONDANSETRON 4 MG/1
4 TABLET, ORALLY DISINTEGRATING ORAL EVERY 8 HOURS PRN
Qty: 45 TABLET | Refills: 11 | Status: SHIPPED | OUTPATIENT
Start: 2019-02-19

## 2019-02-19 NOTE — TELEPHONE ENCOUNTER
KASSIDYDANIEL SALAZAR IS HAVING PRB WITH [facility]IC MAKING HER VERY NAUSEOUS AND HAS BEEN VOMITING .he IS NEEDING TO KNOW WHAT TO DO   PLEASE CALL HIM BACK

## 2019-02-19 NOTE — TELEPHONE ENCOUNTER
KASSIDY SALAZAR IS HAVING PRB WITH [facility] MAKING HER VERY NAUSEOUS AND HAS BEEN VOMITING .he IS NEEDING TO KNOW WHAT TO DO   PLEASE CALL HIM BACK          Documentation          Spoke to patient and he says he is nauseated and vomits every time he has taken the meds. He said he thought he might get used to it but still the same after the 4th shot. He is still on the lower dose.  Help, Irina    ====    I spoke with patient, he will stop Ozempic and restart Januvia.  I have called in a prescription for Zofran

## 2019-05-06 ENCOUNTER — TELEPHONE (OUTPATIENT)
Dept: ENDOCRINOLOGY | Facility: CLINIC | Age: 69
End: 2019-05-06

## 2019-05-06 NOTE — TELEPHONE ENCOUNTER
Needs lab orders faxed to Dr Green in [place]  Fax # [phone]    Pt called back and needs shot of Repatha Friday and waiting for pre authorization. Do we have samples until it goes through Please call   [phone]

## 2019-05-15 ENCOUNTER — OFFICE VISIT (OUTPATIENT)
Dept: ENDOCRINOLOGY | Facility: CLINIC | Age: 69
End: 2019-05-15

## 2019-05-15 ENCOUNTER — APPOINTMENT (OUTPATIENT)
Dept: LAB | Facility: HOSPITAL | Age: 69
End: 2019-05-15

## 2019-05-15 VITALS
SYSTOLIC BLOOD PRESSURE: 134 MMHG | HEART RATE: 84 BPM | OXYGEN SATURATION: 97 % | HEIGHT: 70 IN | DIASTOLIC BLOOD PRESSURE: 76 MMHG | BODY MASS INDEX: 25.2 KG/M2 | WEIGHT: 176 LBS

## 2019-05-15 DIAGNOSIS — I15.2 HYPERTENSION ASSOCIATED WITH DIABETES (HCC): ICD-10-CM

## 2019-05-15 DIAGNOSIS — E11.9 TYPE 2 DIABETES MELLITUS WITHOUT COMPLICATION, WITHOUT LONG-TERM CURRENT USE OF INSULIN (HCC): Primary | ICD-10-CM

## 2019-05-15 DIAGNOSIS — E78.2 MIXED DIABETIC HYPERLIPIDEMIA ASSOCIATED WITH TYPE 2 DIABETES MELLITUS (HCC): ICD-10-CM

## 2019-05-15 DIAGNOSIS — E11.59 HYPERTENSION ASSOCIATED WITH DIABETES (HCC): ICD-10-CM

## 2019-05-15 DIAGNOSIS — E11.69 MIXED DIABETIC HYPERLIPIDEMIA ASSOCIATED WITH TYPE 2 DIABETES MELLITUS (HCC): ICD-10-CM

## 2019-05-15 DIAGNOSIS — I25.10 CORONARY ARTERY DISEASE INVOLVING NATIVE HEART WITHOUT ANGINA PECTORIS, UNSPECIFIED VESSEL OR LESION TYPE: ICD-10-CM

## 2019-05-15 DIAGNOSIS — E55.9 VITAMIN D DEFICIENCY: ICD-10-CM

## 2019-05-15 LAB — HBA1C MFR BLD: 7.3 %

## 2019-05-15 PROCEDURE — 99214 OFFICE O/P EST MOD 30 MIN: CPT | Performed by: INTERNAL MEDICINE

## 2019-05-15 PROCEDURE — 80061 LIPID PANEL: CPT | Performed by: INTERNAL MEDICINE

## 2019-05-15 PROCEDURE — 36415 COLL VENOUS BLD VENIPUNCTURE: CPT | Performed by: INTERNAL MEDICINE

## 2019-05-15 NOTE — PROGRESS NOTES
Erick Antonio is a 68 y.o. male who presents for  evaluation of   Chief Complaint   Patient presents with   • Diabetes         Primary Care / Referring Provider  Willis Green MD    68 y.o.    ==    also T2 DM  constant   well controlled   severity moderate      Had MI , they attributed to testosterone but most likely related to the fact that he wasn't on a statin   He can't tolerate statins.  Was on repatha and lipids at goal but insurance stopped paying     Past Medical History:   Diagnosis Date   • Arthritis    • Benign essential hypertension    • Coronary arteriosclerosis      nonobstructive CAD      • Dyslipidemia    • History of shingles     of the eyes   • Hypercholesterolemia    • Male hypogonadism    • Type 2 diabetes mellitus (CMS/HCC)      Family History   Problem Relation Age of Onset   • Hypertension Other    • Hyperlipidemia Other    • Hypertension Mother    • Cancer Mother    • Heart disease Mother    • Mental illness Mother    • Kidney disease Mother    • Stroke Mother    • Hypertension Father    • Alcohol abuse Father    • Heart disease Father    • Cancer Sister    • Hypertension Brother    • Heart disease Brother    • Diabetes Son      Social History     Tobacco Use   • Smoking status: Never Smoker   • Smokeless tobacco: Never Used   Substance Use Topics   • Alcohol use: No   • Drug use: No         Current Outpatient Medications:   •  aspirin 81 MG chewable tablet, Chew 1 tablet Daily., Disp: 90 tablet, Rfl: 12  •  atenolol (TENORMIN) 25 MG tablet, Take 12.5 mg by mouth Daily., Disp: , Rfl:   •  Evolocumab (REPATHA SURECLICK) 140 MG/ML solution auto-injector, Inject 140 mg of ampicillin under the skin Every 14 (Fourteen) Days., Disp: 2 pen, Rfl: 11  •  gabapentin (NEURONTIN) 100 MG capsule, Take 2 capsules by mouth every night at bedtime., Disp: 60 capsule, Rfl: 5  •  glucose blood (FREESTYLE LITE) test strip, 1 each by Other route Daily. Use as instructed, Disp: , Rfl:   •  JARDIANCE 10 MG  tablet, TAKE ONE TABLET(S) BY MOUTH EVERY DAY BEFORE BREAKFAST., Disp: 90 tablet, Rfl: 0  •  nitroglycerin (NITROSTAT) 0.4 MG SL tablet, Place 0.4 mg under the tongue Every 5 (Five) Minutes As Needed for Chest Pain. Take no more than 3 doses in 15 minutes., Disp: , Rfl:   •  ondansetron ODT (ZOFRAN-ODT) 4 MG disintegrating tablet, Take 1 tablet by mouth Every 8 (Eight) Hours As Needed for Nausea or Vomiting., Disp: 45 tablet, Rfl: 11  •  prednisoLONE acetate (PRED FORTE) 1 % ophthalmic suspension, 1 drop 4 (Four) Times a Day. 1 drop in affected eye four times a day., Disp: , Rfl:   •  tamsulosin (FLOMAX) 0.4 MG capsule 24 hr capsule, Take 1 capsule by mouth Daily., Disp: , Rfl:   •  trifluridine (VIROPTIC) 1 % ophthalmic solution, As Needed., Disp: , Rfl:   •  vitamin D (ERGOCALCIFEROL) 07420 units capsule capsule, Take 1 capsule by mouth Every 7 (Seven) Days., Disp: 12 capsule, Rfl: 11    Review of Systems    Review of Systems   Constitutional: Negative for activity change, appetite change, chills, diaphoresis, fatigue, fever and unexpected weight change.   HENT: Negative for congestion, drooling, ear discharge, ear pain, facial swelling, mouth sores, nosebleeds, postnasal drip, rhinorrhea, sinus pressure, sneezing, sore throat, tinnitus, trouble swallowing and voice change.    Eyes: Negative for photophobia, pain, discharge, redness, itching and visual disturbance.   Respiratory: Negative for apnea, cough, choking, chest tightness, shortness of breath, wheezing and stridor.    Cardiovascular: Negative for chest pain, palpitations and leg swelling.   Gastrointestinal: Negative for abdominal distention, abdominal pain, anal bleeding, blood in stool, constipation, diarrhea, nausea, rectal pain and vomiting.   Endocrine: Negative for cold intolerance, heat intolerance, polydipsia, polyphagia and polyuria.   Genitourinary: Negative for difficulty urinating, dysuria, enuresis, flank pain, frequency, hematuria and  "urgency.   Musculoskeletal: Negative for arthralgias, back pain, gait problem, joint swelling, myalgias, neck pain and neck stiffness.   Skin: Negative for color change, pallor and wound.   Allergic/Immunologic: Negative for environmental allergies, food allergies and immunocompromised state.   Neurological: Negative for dizziness, tremors, seizures, syncope, facial asymmetry, speech difficulty, weakness, light-headedness, numbness and headaches.   Hematological: Negative for adenopathy. Does not bruise/bleed easily.   Psychiatric/Behavioral: Negative for agitation, behavioral problems, confusion, decreased concentration, dysphoric mood, hallucinations, self-injury, sleep disturbance and suicidal ideas. The patient is not nervous/anxious and is not hyperactive.         Objective:     /76   Pulse 84   Ht 177.8 cm (70\")   Wt 79.8 kg (176 lb)   SpO2 97%   BMI 25.25 kg/m²     Physical Exam   Constitutional: He is oriented to person, place, and time. He appears well-developed and well-nourished. He is cooperative.   HENT:   Head: Normocephalic and atraumatic.   Right Ear: External ear normal.   Left Ear: External ear normal.   Nose: Nose normal.   Mouth/Throat: Oropharynx is clear and moist. No oropharyngeal exudate.   Eyes: Conjunctivae and EOM are normal. Pupils are equal, round, and reactive to light. No scleral icterus. Right eye exhibits normal extraocular motion. Left eye exhibits normal extraocular motion.   Neck: Neck supple. No JVD present. No muscular tenderness present. No tracheal deviation, no edema and no erythema present. No thyromegaly present.   Cardiovascular: Normal rate, regular rhythm, normal heart sounds and intact distal pulses. Exam reveals no gallop and no friction rub.   No murmur heard.  Pulmonary/Chest: Effort normal and breath sounds normal. No stridor. No respiratory distress. He has no decreased breath sounds. He has no wheezes. He has no rhonchi. He has no rales. He exhibits no " tenderness.   Abdominal: Soft. Bowel sounds are normal. He exhibits no distension and no mass. There is no hepatomegaly. There is no tenderness. There is no rebound and no guarding. No hernia.   Musculoskeletal: Normal range of motion. He exhibits no edema, tenderness or deformity.     Vascular Status -  His right foot exhibits normal foot vasculature . His left foot exhibits normal foot vasculature .  Lymphadenopathy:     He has no cervical adenopathy.   Neurological: He is alert and oriented to person, place, and time. He has normal reflexes. No cranial nerve deficit. He exhibits normal muscle tone. Coordination normal.   Skin: Skin is warm. No rash noted. No erythema. No pallor.   Psychiatric: He has a normal mood and affect. His behavior is normal. Judgment and thought content normal.   Nursing note and vitals reviewed.      Lab Review    Results for orders placed or performed in visit on 05/15/19   Hemoglobin A1c   Result Value Ref Range    Hemoglobin A1C 7.3            Assessment/Plan       ICD-10-CM ICD-9-CM   1. Type 2 diabetes mellitus without complication, without long-term current use of insulin (CMS/HCC) E11.9 250.00   2. Hypertension associated with diabetes (CMS/HCC) E11.59 250.80    I10 401.9   3. Coronary artery disease involving native heart without angina pectoris, unspecified vessel or lesion type I25.10 414.01   4. Mixed diabetic hyperlipidemia associated with type 2 diabetes mellitus (CMS/HCC) E11.69 250.80    E78.2 272.2   5. Vitamin D deficiency E55.9 268.9       Glycemic Management:      Lab Results   Component Value Date    HGBA1C 7.3 05/07/2019    HGBA1C 7 11/06/2017    HGBA1C 7.61 (H) 02/01/2017     Lab Results   Component Value Date    CREATININE 1.15 04/10/2017         Metformin 500 mg tablets once a day - stop since diarrhea , we may consider in the future     Januvia 100 mg before breakfast  ozempic resulted in side effects      Jardiance 10 mg before bkfast   gfr 42, focus on  hydration - now nlized     Neuropathy - neurontin 200 mg qhs before , off of it   Lipid Management    Was on  lipitor 80 mg qhs and zetia 10 mg daily  But this resulted in myalgias      on repatha  LDL52 but it donavon to 133 since off of it          Blood Pressure Management  Vitals:    05/15/19 1516   BP: 134/76   Pulse: 84   SpO2: 97%       atenolol 25 mg tablet, 1 tablet(s) by mouth daily     Immunizations:  Last influenza immunization 2016, Last pneumococcal immunization has had before age 66     Preventive Care:  Patient is not smoking    Weight Related:  Overweight, Counseled on nutrition, Counseled on physical activity    Bone Health  12-14 nl DXA         vit D 50 th u q 2 w - changed to otc       Other Diabetes Related Aspects      March 2018    tsH 3.39  b12     --    Hypogonadism     Has bph controlled on flomax     Was on IM testosterone but  stopped due to MI       I reviewed and summarized records from Willis Green MD from 2016 and I reviewed / ordered labs.     Orders Placed This Encounter   Procedures   • Hemoglobin A1c     This order was created through External Result Entry         A copy of my note was sent to Willis Green MD    Please see my above opinion and suggestions.

## 2019-05-16 LAB
CHOLEST SERPL-MCNC: 193 MG/DL (ref 0–200)
HDLC SERPL-MCNC: 42 MG/DL (ref 40–60)
LDLC SERPL CALC-MCNC: 90 MG/DL (ref 0–100)
LDLC/HDLC SERPL: 2.14 {RATIO}
TRIGL SERPL-MCNC: 306 MG/DL (ref 0–150)
VLDLC SERPL-MCNC: 61.2 MG/DL (ref 5–40)

## 2019-05-20 ENCOUNTER — TELEPHONE (OUTPATIENT)
Dept: ENDOCRINOLOGY | Facility: CLINIC | Age: 69
End: 2019-05-20

## 2019-05-20 ENCOUNTER — TELEPHONE (OUTPATIENT)
Dept: FAMILY MEDICINE CLINIC | Facility: CLINIC | Age: 69
End: 2019-05-20

## 2019-09-09 ENCOUNTER — TELEPHONE (OUTPATIENT)
Dept: FAMILY MEDICINE CLINIC | Facility: CLINIC | Age: 69
End: 2019-09-09

## 2019-09-12 ENCOUNTER — TELEPHONE (OUTPATIENT)
Dept: ENDOCRINOLOGY | Facility: CLINIC | Age: 69
End: 2019-09-12

## 2019-12-03 ENCOUNTER — OFFICE VISIT (OUTPATIENT)
Dept: ENDOCRINOLOGY | Facility: CLINIC | Age: 69
End: 2019-12-03

## 2019-12-03 VITALS
WEIGHT: 169.7 LBS | SYSTOLIC BLOOD PRESSURE: 138 MMHG | BODY MASS INDEX: 24.29 KG/M2 | OXYGEN SATURATION: 98 % | HEART RATE: 92 BPM | DIASTOLIC BLOOD PRESSURE: 76 MMHG | HEIGHT: 70 IN

## 2019-12-03 DIAGNOSIS — E11.69 MIXED DIABETIC HYPERLIPIDEMIA ASSOCIATED WITH TYPE 2 DIABETES MELLITUS (HCC): ICD-10-CM

## 2019-12-03 DIAGNOSIS — I15.2 HYPERTENSION ASSOCIATED WITH DIABETES (HCC): ICD-10-CM

## 2019-12-03 DIAGNOSIS — E55.9 VITAMIN D DEFICIENCY: ICD-10-CM

## 2019-12-03 DIAGNOSIS — E78.2 MIXED DIABETIC HYPERLIPIDEMIA ASSOCIATED WITH TYPE 2 DIABETES MELLITUS (HCC): ICD-10-CM

## 2019-12-03 DIAGNOSIS — E29.1 HYPOGONADISM IN MALE: ICD-10-CM

## 2019-12-03 DIAGNOSIS — I25.10 CORONARY ARTERY DISEASE INVOLVING NATIVE HEART WITHOUT ANGINA PECTORIS, UNSPECIFIED VESSEL OR LESION TYPE: ICD-10-CM

## 2019-12-03 DIAGNOSIS — E11.9 TYPE 2 DIABETES MELLITUS WITHOUT COMPLICATION, WITHOUT LONG-TERM CURRENT USE OF INSULIN (HCC): Primary | ICD-10-CM

## 2019-12-03 DIAGNOSIS — E11.59 HYPERTENSION ASSOCIATED WITH DIABETES (HCC): ICD-10-CM

## 2019-12-03 LAB — HBA1C MFR BLD: 7.3 %

## 2019-12-03 PROCEDURE — 99214 OFFICE O/P EST MOD 30 MIN: CPT | Performed by: INTERNAL MEDICINE

## 2019-12-03 RX ORDER — ERGOCALCIFEROL 1.25 MG/1
50000 CAPSULE ORAL
Qty: 6 CAPSULE | Refills: 3 | Status: SHIPPED | OUTPATIENT
Start: 2019-12-03 | End: 2020-08-25

## 2019-12-03 NOTE — PROGRESS NOTES
Erick Antonio is a 69 y.o. male who presents for  evaluation of   Chief Complaint   Patient presents with   • Diabetes         Primary Care / Referring Provider  Willis Green MD    69 y.o.    ==    also T2 DM  constant   well controlled   severity moderate      Had MI , they attributed to testosterone but most likely related to the fact that he wasn't on a statin   He can't tolerate statins.  Now on repatha     Past Medical History:   Diagnosis Date   • Arthritis    • Benign essential hypertension    • Coronary arteriosclerosis      nonobstructive CAD      • Dyslipidemia    • History of shingles     of the eyes   • Hypercholesterolemia    • Male hypogonadism    • Type 2 diabetes mellitus (CMS/HCC)      Family History   Problem Relation Age of Onset   • Hypertension Other    • Hyperlipidemia Other    • Hypertension Mother    • Cancer Mother    • Heart disease Mother    • Mental illness Mother    • Kidney disease Mother    • Stroke Mother    • Hypertension Father    • Alcohol abuse Father    • Heart disease Father    • Cancer Sister    • Hypertension Brother    • Heart disease Brother    • Diabetes Son      Social History     Tobacco Use   • Smoking status: Never Smoker   • Smokeless tobacco: Never Used   Substance Use Topics   • Alcohol use: No   • Drug use: No         Current Outpatient Medications:   •  aspirin 81 MG chewable tablet, Chew 1 tablet Daily., Disp: 90 tablet, Rfl: 12  •  atenolol (TENORMIN) 25 MG tablet, Take 12.5 mg by mouth Daily., Disp: , Rfl:   •  Evolocumab (REPATHA SURECLICK) 140 MG/ML solution auto-injector, Inject 1 mL under the skin into the appropriate area as directed Every 14 (Fourteen) Days., Disp: 2 pen, Rfl: 11  •  glucose blood (FREESTYLE LITE) test strip, 1 each by Other route Daily. Use as instructed, Disp: , Rfl:   •  JARDIANCE 10 MG tablet, TAKE ONE TABLET(S) BY MOUTH EVERY DAY BEFORE BREAKFAST., Disp: 90 tablet, Rfl: 0  •  nitroglycerin (NITROSTAT) 0.4 MG SL tablet, Place 0.4  mg under the tongue Every 5 (Five) Minutes As Needed for Chest Pain. Take no more than 3 doses in 15 minutes., Disp: , Rfl:   •  ondansetron ODT (ZOFRAN-ODT) 4 MG disintegrating tablet, Take 1 tablet by mouth Every 8 (Eight) Hours As Needed for Nausea or Vomiting., Disp: 45 tablet, Rfl: 11  •  prednisoLONE acetate (PRED FORTE) 1 % ophthalmic suspension, 1 drop 4 (Four) Times a Day. 1 drop in affected eye four times a day., Disp: , Rfl:   •  SITagliptin (JANUVIA) 100 MG tablet, Take 100 mg by mouth Daily., Disp: , Rfl:   •  tamsulosin (FLOMAX) 0.4 MG capsule 24 hr capsule, Take 1 capsule by mouth Daily., Disp: , Rfl:   •  trifluridine (VIROPTIC) 1 % ophthalmic solution, As Needed., Disp: , Rfl:   •  vitamin D (ERGOCALCIFEROL) 53346 units capsule capsule, Take 1 capsule by mouth Every 7 (Seven) Days., Disp: 12 capsule, Rfl: 11    Review of Systems    Review of Systems   Constitutional: Negative for activity change, appetite change, chills, diaphoresis, fatigue, fever and unexpected weight change.   HENT: Negative for congestion, drooling, ear discharge, ear pain, facial swelling, mouth sores, nosebleeds, postnasal drip, rhinorrhea, sinus pressure, sneezing, sore throat, tinnitus, trouble swallowing and voice change.    Eyes: Negative for photophobia, pain, discharge, redness, itching and visual disturbance.   Respiratory: Negative for apnea, cough, choking, chest tightness, shortness of breath, wheezing and stridor.    Cardiovascular: Negative for chest pain, palpitations and leg swelling.   Gastrointestinal: Negative for abdominal distention, abdominal pain, anal bleeding, blood in stool, constipation, diarrhea, nausea, rectal pain and vomiting.   Endocrine: Negative for cold intolerance, heat intolerance, polydipsia, polyphagia and polyuria.   Genitourinary: Negative for difficulty urinating, dysuria, enuresis, flank pain, frequency, hematuria and urgency.   Musculoskeletal: Negative for arthralgias, back pain, gait  "problem, joint swelling, myalgias, neck pain and neck stiffness.   Skin: Negative for color change, pallor and wound.   Allergic/Immunologic: Negative for environmental allergies, food allergies and immunocompromised state.   Neurological: Negative for dizziness, tremors, seizures, syncope, facial asymmetry, speech difficulty, weakness, light-headedness, numbness and headaches.   Hematological: Negative for adenopathy. Does not bruise/bleed easily.   Psychiatric/Behavioral: Negative for agitation, behavioral problems, confusion, decreased concentration, dysphoric mood, hallucinations, self-injury, sleep disturbance and suicidal ideas. The patient is not nervous/anxious and is not hyperactive.         Objective:     /76   Pulse 92   Ht 177.8 cm (70\")   Wt 77 kg (169 lb 11.2 oz)   SpO2 98%   BMI 24.35 kg/m²     Physical Exam   Constitutional: He is oriented to person, place, and time. He appears well-developed and well-nourished. He is cooperative.   HENT:   Head: Normocephalic and atraumatic.   Right Ear: External ear normal.   Left Ear: External ear normal.   Nose: Nose normal.   Mouth/Throat: Oropharynx is clear and moist. No oropharyngeal exudate.   Eyes: Conjunctivae and EOM are normal. Pupils are equal, round, and reactive to light. No scleral icterus. Right eye exhibits normal extraocular motion. Left eye exhibits normal extraocular motion.   Neck: Neck supple. No JVD present. No muscular tenderness present. No tracheal deviation, no edema and no erythema present. No thyromegaly present.   Cardiovascular: Normal rate, regular rhythm, normal heart sounds and intact distal pulses. Exam reveals no gallop and no friction rub.   No murmur heard.  Pulmonary/Chest: Effort normal and breath sounds normal. No stridor. No respiratory distress. He has no decreased breath sounds. He has no wheezes. He has no rhonchi. He has no rales. He exhibits no tenderness.   Abdominal: Soft. Bowel sounds are normal. He " exhibits no distension and no mass. There is no hepatomegaly. There is no tenderness. There is no rebound and no guarding. No hernia.   Musculoskeletal: Normal range of motion. He exhibits no edema, tenderness or deformity.     Vascular Status -  His right foot exhibits normal foot vasculature . His left foot exhibits normal foot vasculature .  Lymphadenopathy:     He has no cervical adenopathy.   Neurological: He is alert and oriented to person, place, and time. He has normal reflexes. No cranial nerve deficit. He exhibits normal muscle tone. Coordination normal.   Skin: Skin is warm. No rash noted. No erythema. No pallor.   Psychiatric: He has a normal mood and affect. His behavior is normal. Judgment and thought content normal.   Nursing note and vitals reviewed.      Lab Review    Results for orders placed or performed in visit on 05/15/19   Hemoglobin A1c   Result Value Ref Range    Hemoglobin A1C 7.3    Lipid Panel   Result Value Ref Range    Total Cholesterol 193 0 - 200 mg/dL    Triglycerides 306 (H) 0 - 150 mg/dL    HDL Cholesterol 42 40 - 60 mg/dL    LDL Cholesterol  90 0 - 100 mg/dL    VLDL Cholesterol 61.2 (H) 5 - 40 mg/dL    LDL/HDL Ratio 2.14            Assessment/Plan       ICD-10-CM ICD-9-CM   1. Type 2 diabetes mellitus without complication, without long-term current use of insulin (CMS/[facility]) E11.9 250.00   2. Hypertension associated with diabetes (CMS/[facility]) E11.59 250.80    I10 401.9   3. Mixed diabetic hyperlipidemia associated with type 2 diabetes mellitus (CMS/[facility]) E11.69 250.80    E78.2 272.2   4. Hypogonadism in male E29.1 257.2   5. Vitamin D deficiency E55.9 268.9   6. Coronary artery disease involving native heart without angina pectoris, unspecified vessel or lesion type I25.10 414.01       Glycemic Management:      Lab Results   Component Value Date    HGBA1C 7.3 05/07/2019    HGBA1C 7 11/06/2017    HGBA1C 7.61 (H) 02/01/2017     Lab Results   Component Value Date    CREATININE 1.15  04/10/2017         Metformin 500 mg tablets once a day - stop since diarrhea , we may consider in the future     Januvia 100 mg before breakfast  ozempic resulted in side effects      Jardiance 10 mg before bkfast   59    Neuropathy - neurontin 200 mg qhs before , off of it   Lipid Management    Was on  lipitor 80 mg qhs and zetia 10 mg daily  But this resulted in myalgias      on repatha again   LDL couldn't be obtained since     Tg elevation, stop Dr. Levin         Blood Pressure Management  Vitals:    12/03/19 1133   BP: 138/76   Pulse: 92   SpO2: 98%       atenolol 25 mg tablet, 1 tablet(s) by mouth daily     Immunizations:  Last influenza immunization 2016, Last pneumococcal immunization has had before age 66     Preventive Care:  Patient is not smoking    Weight Related:  Overweight, Counseled on nutrition, Counseled on physical activity    Bone Health  12-14 nl DXA         vit D 50 th u q 2 w - changed to otc, change to vit D 50 th units every 2 weeks        Mild elevation of calcium , monitor trend     Other Diabetes Related Aspects      March 2018    tsH 3.39  b12     --    Hypogonadism     Has bph controlled on flomax     Was on IM testosterone but  stopped due to MI       I reviewed and summarized records from Willis Green MD from 2019 and I reviewed / ordered labs.     No orders of the defined types were placed in this encounter.        A copy of my note was sent to Willis Green MD    Please see my above opinion and suggestions.

## 2020-05-04 RX ORDER — EVOLOCUMAB 140 MG/ML
INJECTION, SOLUTION SUBCUTANEOUS
Qty: 2 PEN | Refills: 0 | Status: SHIPPED | OUTPATIENT
Start: 2020-05-04 | End: 2020-06-09 | Stop reason: SDUPTHER

## 2020-05-15 ENCOUNTER — TELEPHONE (OUTPATIENT)
Dept: ENDOCRINOLOGY | Facility: CLINIC | Age: 70
End: 2020-05-15

## 2020-05-15 NOTE — TELEPHONE ENCOUNTER
Says that he is suppose to call to let you know it is time for him to get an approval on his cholesterol shot. Says he will come in and get it in June and you always get it approved before he comes in.

## 2020-05-15 NOTE — TELEPHONE ENCOUNTER
At 11:57 I made patient aware we go his request for his cholesterol shot and let him know we are working on it.

## 2020-05-20 ENCOUNTER — TELEPHONE (OUTPATIENT)
Dept: ENDOCRINOLOGY | Facility: CLINIC | Age: 70
End: 2020-05-20

## 2020-05-20 NOTE — TELEPHONE ENCOUNTER
Pt needs lab orders sent to PCP he lost the orders that were given to him.   Dr Beard, having done before appt.      Fax [phone]  Thank You

## 2020-05-21 ENCOUNTER — TELEPHONE (OUTPATIENT)
Dept: ENDOCRINOLOGY | Facility: CLINIC | Age: 70
End: 2020-05-21

## 2020-06-03 ENCOUNTER — OFFICE VISIT (OUTPATIENT)
Dept: ENDOCRINOLOGY | Facility: CLINIC | Age: 70
End: 2020-06-03

## 2020-06-03 VITALS
SYSTOLIC BLOOD PRESSURE: 132 MMHG | DIASTOLIC BLOOD PRESSURE: 78 MMHG | HEART RATE: 85 BPM | OXYGEN SATURATION: 98 % | BODY MASS INDEX: 25.21 KG/M2 | HEIGHT: 70 IN | WEIGHT: 176.1 LBS

## 2020-06-03 DIAGNOSIS — I25.10 CORONARY ARTERY DISEASE INVOLVING NATIVE HEART WITHOUT ANGINA PECTORIS, UNSPECIFIED VESSEL OR LESION TYPE: ICD-10-CM

## 2020-06-03 DIAGNOSIS — E11.9 TYPE 2 DIABETES MELLITUS WITHOUT COMPLICATION, WITHOUT LONG-TERM CURRENT USE OF INSULIN (HCC): Primary | ICD-10-CM

## 2020-06-03 DIAGNOSIS — I15.2 HYPERTENSION ASSOCIATED WITH DIABETES (HCC): ICD-10-CM

## 2020-06-03 DIAGNOSIS — E11.42 DIABETIC POLYNEUROPATHY ASSOCIATED WITH TYPE 2 DIABETES MELLITUS (HCC): ICD-10-CM

## 2020-06-03 DIAGNOSIS — E11.59 HYPERTENSION ASSOCIATED WITH DIABETES (HCC): ICD-10-CM

## 2020-06-03 DIAGNOSIS — E78.2 MIXED DIABETIC HYPERLIPIDEMIA ASSOCIATED WITH TYPE 2 DIABETES MELLITUS (HCC): ICD-10-CM

## 2020-06-03 DIAGNOSIS — E11.69 MIXED DIABETIC HYPERLIPIDEMIA ASSOCIATED WITH TYPE 2 DIABETES MELLITUS (HCC): ICD-10-CM

## 2020-06-03 DIAGNOSIS — E55.9 VITAMIN D DEFICIENCY: ICD-10-CM

## 2020-06-03 PROCEDURE — 99214 OFFICE O/P EST MOD 30 MIN: CPT | Performed by: INTERNAL MEDICINE

## 2020-06-03 RX ORDER — LOSARTAN POTASSIUM 25 MG/1
25 TABLET ORAL DAILY
COMMUNITY

## 2020-06-03 RX ORDER — PRASUGREL 10 MG/1
10 TABLET, FILM COATED ORAL DAILY
COMMUNITY

## 2020-06-03 RX ORDER — GABAPENTIN 100 MG/1
CAPSULE ORAL
Qty: 30 CAPSULE | Refills: 5 | Status: SHIPPED | OUTPATIENT
Start: 2020-06-03 | End: 2021-03-30

## 2020-06-03 NOTE — PROGRESS NOTES
Erick Antonio is a 69 y.o. male who presents for  evaluation of   Chief Complaint   Patient presents with   • Diabetes            Primary Care / Referring Provider  Willis Green MD    69 y.o.     also T2 DM  constant   well controlled   severity moderate      Had MI , they attributed to testosterone but most likely related to the fact that he wasn't on a statin   He can't tolerate statins.  Now on repatha and LDL effectively reduced     Past Medical History:   Diagnosis Date   • Arthritis    • Benign essential hypertension    • Coronary arteriosclerosis      nonobstructive CAD      • Dyslipidemia    • History of shingles     of the eyes   • Hypercholesterolemia    • Male hypogonadism    • Type 2 diabetes mellitus (CMS/HCC)      Family History   Problem Relation Age of Onset   • Hypertension Other    • Hyperlipidemia Other    • Hypertension Mother    • Cancer Mother    • Heart disease Mother    • Mental illness Mother    • Kidney disease Mother    • Stroke Mother    • Hypertension Father    • Alcohol abuse Father    • Heart disease Father    • Cancer Sister    • Hypertension Brother    • Heart disease Brother    • Diabetes Son      Social History     Tobacco Use   • Smoking status: Never Smoker   • Smokeless tobacco: Never Used   Substance Use Topics   • Alcohol use: No   • Drug use: No         Current Outpatient Medications:   •  aspirin 81 MG chewable tablet, Chew 1 tablet Daily., Disp: 90 tablet, Rfl: 12  •  atenolol (TENORMIN) 25 MG tablet, Take 12.5 mg by mouth Daily., Disp: , Rfl:   •  glucose blood (FREESTYLE LITE) test strip, 1 each by Other route Daily. Use as instructed, Disp: , Rfl:   •  JARDIANCE 10 MG tablet, TAKE ONE TABLET(S) BY MOUTH EVERY DAY BEFORE BREAKFAST., Disp: 90 tablet, Rfl: 0  •  nitroglycerin (NITROSTAT) 0.4 MG SL tablet, Place 0.4 mg under the tongue Every 5 (Five) Minutes As Needed for Chest Pain. Take no more than 3 doses in 15 minutes., Disp: , Rfl:   •  ondansetron ODT  (ZOFRAN-ODT) 4 MG disintegrating tablet, Take 1 tablet by mouth Every 8 (Eight) Hours As Needed for Nausea or Vomiting., Disp: 45 tablet, Rfl: 11  •  prasugrel (EFFIENT) 10 MG tablet, Take 10 mg by mouth Daily., Disp: , Rfl:   •  prednisoLONE acetate (PRED FORTE) 1 % ophthalmic suspension, 1 drop 4 (Four) Times a Day. 1 drop in affected eye four times a day., Disp: , Rfl:   •  REPATHA SURECLICK 140 MG/ML solution auto-injector, INJECT 1 PEN (140MG) SUBCUTANEOUSLY EVERY 14 DAYS, Disp: 2 pen, Rfl: 0  •  SITagliptin (JANUVIA) 100 MG tablet, Take 100 mg by mouth Daily., Disp: , Rfl:   •  tamsulosin (FLOMAX) 0.4 MG capsule 24 hr capsule, Take 1 capsule by mouth Daily., Disp: , Rfl:   •  trifluridine (VIROPTIC) 1 % ophthalmic solution, As Needed., Disp: , Rfl:   •  vitamin D (ERGOCALCIFEROL) 1.25 MG (11847 UT) capsule capsule, Take 1 capsule by mouth Every 14 (Fourteen) Days., Disp: 6 capsule, Rfl: 3    Review of Systems    Review of Systems   Constitutional: Negative for activity change, appetite change, chills, diaphoresis, fatigue, fever and unexpected weight change.   HENT: Negative for congestion, drooling, ear discharge, ear pain, facial swelling, mouth sores, nosebleeds, postnasal drip, rhinorrhea, sinus pressure, sneezing, sore throat, tinnitus, trouble swallowing and voice change.    Eyes: Negative for photophobia, pain, discharge, redness, itching and visual disturbance.   Respiratory: Negative for apnea, cough, choking, chest tightness, shortness of breath, wheezing and stridor.    Cardiovascular: Negative for chest pain, palpitations and leg swelling.   Gastrointestinal: Negative for abdominal distention, abdominal pain, anal bleeding, blood in stool, constipation, diarrhea, nausea, rectal pain and vomiting.   Endocrine: Negative for cold intolerance, heat intolerance, polydipsia, polyphagia and polyuria.   Genitourinary: Negative for difficulty urinating, dysuria, enuresis, flank pain, frequency, hematuria  "and urgency.   Musculoskeletal: Negative for arthralgias, back pain, gait problem, joint swelling, myalgias, neck pain and neck stiffness.   Skin: Negative for color change, pallor and wound.   Allergic/Immunologic: Negative for environmental allergies, food allergies and immunocompromised state.   Neurological: Negative for dizziness, tremors, seizures, syncope, facial asymmetry, speech difficulty, weakness, light-headedness, numbness and headaches.   Hematological: Negative for adenopathy. Does not bruise/bleed easily.   Psychiatric/Behavioral: Negative for agitation, behavioral problems, confusion, decreased concentration, dysphoric mood, hallucinations, self-injury, sleep disturbance and suicidal ideas. The patient is not nervous/anxious and is not hyperactive.         Objective:     /78   Pulse 85   Ht 177.8 cm (70\")   Wt 79.9 kg (176 lb 1.6 oz)   SpO2 98%   BMI 25.27 kg/m²     Physical Exam   Constitutional: He is oriented to person, place, and time. He appears well-developed and well-nourished. He is cooperative.   HENT:   Head: Normocephalic and atraumatic.   Right Ear: External ear normal.   Left Ear: External ear normal.   Nose: Nose normal.   Mouth/Throat: Oropharynx is clear and moist. No oropharyngeal exudate.   Eyes: Pupils are equal, round, and reactive to light. Conjunctivae and EOM are normal. No scleral icterus. Right eye exhibits normal extraocular motion. Left eye exhibits normal extraocular motion.   Neck: Neck supple. No JVD present. No muscular tenderness present. No tracheal deviation, no edema and no erythema present. No thyromegaly present.   Cardiovascular: Normal rate, regular rhythm, normal heart sounds and intact distal pulses. Exam reveals no gallop and no friction rub.   No murmur heard.  Pulmonary/Chest: Effort normal and breath sounds normal. No stridor. No respiratory distress. He has no decreased breath sounds. He has no wheezes. He has no rhonchi. He has no rales. He " exhibits no tenderness.   Abdominal: Soft. Bowel sounds are normal. He exhibits no distension and no mass. There is no hepatomegaly. There is no tenderness. There is no rebound and no guarding. No hernia.   Musculoskeletal: Normal range of motion. He exhibits no edema, tenderness or deformity.     Vascular Status -  His right foot exhibits normal foot vasculature . His left foot exhibits normal foot vasculature .  Lymphadenopathy:     He has no cervical adenopathy.   Neurological: He is alert and oriented to person, place, and time. He has normal reflexes. No cranial nerve deficit. He exhibits normal muscle tone. Coordination normal.   Skin: Skin is warm. No rash noted. No erythema. No pallor.   Psychiatric: He has a normal mood and affect. His behavior is normal. Judgment and thought content normal.   Nursing note and vitals reviewed.      Lab Review    Results for orders placed or performed in visit on 12/03/19   Hemoglobin A1c   Result Value Ref Range    Hemoglobin A1C 7.3            Assessment/Plan       ICD-10-CM ICD-9-CM   1. Type 2 diabetes mellitus without complication, without long-term current use of insulin (CMS/[facility]) E11.9 250.00   2. Hypertension associated with diabetes (CMS/[facility]) E11.59 250.80    I10 401.9   3. Mixed diabetic hyperlipidemia associated with type 2 diabetes mellitus (CMS/[facility]) E11.69 250.80    E78.2 272.2   4. Hypogonadism in male E29.1 257.2   5. Vitamin D deficiency E55.9 268.9   6. Coronary artery disease involving native heart without angina pectoris, unspecified vessel or lesion type I25.10 414.01       Glycemic Management:      Lab Results   Component Value Date    HGBA1C 7.3 11/11/2019    HGBA1C 7.3 05/07/2019    HGBA1C 7 11/06/2017     Lab Results   Component Value Date    CREATININE 1.15 04/10/2017           Metformin 500 mg tablets once a day - stop since diarrhea , we may consider in the future     Januvia 100 mg before breakfast  ozempic resulted in side effects      Jardiance  10 mg before bkfast   GFR 59    Neuropathy - neurontin 200 mg qhs before , off of it . Dr Green advised against it     ----------------------------------------------------------      Lipid Management    Was on  lipitor 80 mg qhs and zetia 10 mg daily  But this resulted in myalgias      on repatha again and effective     Tg elevation, stop Dr. Levin         Blood Pressure Management  Vitals:    06/03/20 1032   BP: 132/78   Pulse: 85   SpO2: 98%       atenolol 25 mg tablet, 1 tablet(s) by mouth daily - stopped    No on losartan 25 mg daily      Preventive Care:  Patient is not smoking     Bone Health  12-14 nl DXA         vit D 50 th u q 1 w         Mild elevation of calcium , monitor trend     Other Diabetes Related Aspects     Nl TSH and B12 from 2019     --    Hypogonadism     Has bph controlled on flomax     Was on IM testosterone but  stopped due to MI       I reviewed and summarized records from Willis Green MD from [id number] and I reviewed / ordered labs.     No orders of the defined types were placed in this encounter.        A copy of my note was sent to Willis Green MD    Please see my above opinion and suggestions.

## 2020-08-25 RX ORDER — ERGOCALCIFEROL 1.25 MG/1
CAPSULE ORAL
Qty: 6 CAPSULE | Refills: 0 | Status: SHIPPED | OUTPATIENT
Start: 2020-08-25

## 2020-09-03 ENCOUNTER — OFFICE VISIT (OUTPATIENT)
Dept: ENDOCRINOLOGY | Facility: CLINIC | Age: 70
End: 2020-09-03

## 2020-09-03 VITALS
OXYGEN SATURATION: 98 % | DIASTOLIC BLOOD PRESSURE: 88 MMHG | SYSTOLIC BLOOD PRESSURE: 148 MMHG | WEIGHT: 174.4 LBS | HEART RATE: 91 BPM | HEIGHT: 70 IN | BODY MASS INDEX: 24.97 KG/M2

## 2020-09-03 DIAGNOSIS — I15.2 HYPERTENSION ASSOCIATED WITH DIABETES (HCC): ICD-10-CM

## 2020-09-03 DIAGNOSIS — E11.69 MIXED DIABETIC HYPERLIPIDEMIA ASSOCIATED WITH TYPE 2 DIABETES MELLITUS (HCC): ICD-10-CM

## 2020-09-03 DIAGNOSIS — E29.1 HYPOGONADISM IN MALE: ICD-10-CM

## 2020-09-03 DIAGNOSIS — E55.9 VITAMIN D DEFICIENCY: ICD-10-CM

## 2020-09-03 DIAGNOSIS — E11.42 DIABETIC POLYNEUROPATHY ASSOCIATED WITH TYPE 2 DIABETES MELLITUS (HCC): ICD-10-CM

## 2020-09-03 DIAGNOSIS — E78.2 MIXED DIABETIC HYPERLIPIDEMIA ASSOCIATED WITH TYPE 2 DIABETES MELLITUS (HCC): ICD-10-CM

## 2020-09-03 DIAGNOSIS — E11.9 TYPE 2 DIABETES MELLITUS WITHOUT COMPLICATION, WITHOUT LONG-TERM CURRENT USE OF INSULIN (HCC): Primary | ICD-10-CM

## 2020-09-03 DIAGNOSIS — E11.59 HYPERTENSION ASSOCIATED WITH DIABETES (HCC): ICD-10-CM

## 2020-09-03 DIAGNOSIS — I25.10 CORONARY ARTERY DISEASE INVOLVING NATIVE HEART WITHOUT ANGINA PECTORIS, UNSPECIFIED VESSEL OR LESION TYPE: ICD-10-CM

## 2020-09-03 PROCEDURE — 99214 OFFICE O/P EST MOD 30 MIN: CPT | Performed by: INTERNAL MEDICINE

## 2020-09-03 RX ORDER — PIOGLITAZONEHYDROCHLORIDE 15 MG/1
15 TABLET ORAL DAILY
Qty: 30 TABLET | Refills: 11 | Status: SHIPPED | OUTPATIENT
Start: 2020-09-03 | End: 2021-08-16 | Stop reason: SDUPTHER

## 2020-09-03 NOTE — PROGRESS NOTES
Erick Antonio is a 69 y.o. male who presents for  evaluation of   Chief Complaint   Patient presents with   • Follow-up     6 mo lala             Primary Care / Referring Provider  Willis Green MD    69 y.o.     also T2 DM  constant   well controlled   severity moderate  Has cad, no hf       Had MI , they attributed to testosterone but most likely related to the fact that he wasn't on a statin   He can't tolerate statins.  Now on repatha and LDL effectively reduced     Past Medical History:   Diagnosis Date   • Arthritis    • Benign essential hypertension    • Coronary arteriosclerosis      nonobstructive CAD      • Dyslipidemia    • History of shingles     of the eyes   • Hypercholesterolemia    • Male hypogonadism    • Type 2 diabetes mellitus (CMS/HCC)      Family History   Problem Relation Age of Onset   • Hypertension Other    • Hyperlipidemia Other    • Hypertension Mother    • Cancer Mother    • Heart disease Mother    • Mental illness Mother    • Kidney disease Mother    • Stroke Mother    • Hypertension Father    • Alcohol abuse Father    • Heart disease Father    • Cancer Sister    • Hypertension Brother    • Heart disease Brother    • Diabetes Son      Social History     Tobacco Use   • Smoking status: Never Smoker   • Smokeless tobacco: Never Used   Substance Use Topics   • Alcohol use: No   • Drug use: No         Current Outpatient Medications:   •  aspirin 81 MG chewable tablet, Chew 1 tablet Daily., Disp: 90 tablet, Rfl: 12  •  Evolocumab (Repatha SureClick) 140 MG/ML solution auto-injector, Inject 1 mL under the skin into the appropriate area as directed Every 14 (Fourteen) Days., Disp: 2 pen, Rfl: 0  •  gabapentin (Neurontin) 100 MG capsule, 100 mg po qhs, Disp: 30 capsule, Rfl: 5  •  glucose blood (FREESTYLE LITE) test strip, 1 each by Other route Daily. Use as instructed, Disp: , Rfl:   •  JARDIANCE 10 MG tablet, TAKE ONE TABLET(S) BY MOUTH EVERY DAY BEFORE BREAKFAST., Disp: 90 tablet, Rfl:  0  •  losartan (COZAAR) 25 MG tablet, Take 25 mg by mouth Daily., Disp: , Rfl:   •  nitroglycerin (NITROSTAT) 0.4 MG SL tablet, Place 0.4 mg under the tongue Every 5 (Five) Minutes As Needed for Chest Pain. Take no more than 3 doses in 15 minutes., Disp: , Rfl:   •  ondansetron ODT (ZOFRAN-ODT) 4 MG disintegrating tablet, Take 1 tablet by mouth Every 8 (Eight) Hours As Needed for Nausea or Vomiting., Disp: 45 tablet, Rfl: 11  •  prasugrel (EFFIENT) 10 MG tablet, Take 10 mg by mouth Daily., Disp: , Rfl:   •  prednisoLONE acetate (PRED FORTE) 1 % ophthalmic suspension, 1 drop 4 (Four) Times a Day. 1 drop in affected eye four times a day., Disp: , Rfl:   •  SITagliptin (JANUVIA) 100 MG tablet, Take 100 mg by mouth Daily., Disp: , Rfl:   •  tamsulosin (FLOMAX) 0.4 MG capsule 24 hr capsule, Take 1 capsule by mouth Daily., Disp: , Rfl:   •  trifluridine (VIROPTIC) 1 % ophthalmic solution, As Needed., Disp: , Rfl:   •  vitamin D (ERGOCALCIFEROL) 1.25 MG (73928 UT) capsule capsule, TAKE ONE CAPSULE(S) BY MOUTH EVERY 14 DAYS., Disp: 6 capsule, Rfl: 0    Review of Systems    Review of Systems   Constitutional: Negative for activity change, appetite change, chills, diaphoresis, fatigue, fever and unexpected weight change.   HENT: Negative for congestion, drooling, ear discharge, ear pain, facial swelling, mouth sores, nosebleeds, postnasal drip, rhinorrhea, sinus pressure, sneezing, sore throat, tinnitus, trouble swallowing and voice change.    Eyes: Negative for photophobia, pain, discharge, redness, itching and visual disturbance.   Respiratory: Negative for apnea, cough, choking, chest tightness, shortness of breath, wheezing and stridor.    Cardiovascular: Negative for chest pain, palpitations and leg swelling.   Gastrointestinal: Negative for abdominal distention, abdominal pain, anal bleeding, blood in stool, constipation, diarrhea, nausea, rectal pain and vomiting.   Endocrine: Negative for cold intolerance, heat  "intolerance, polydipsia, polyphagia and polyuria.   Genitourinary: Negative for difficulty urinating, dysuria, enuresis, flank pain, frequency, hematuria and urgency.   Musculoskeletal: Negative for arthralgias, back pain, gait problem, joint swelling, myalgias, neck pain and neck stiffness.   Skin: Negative for color change, pallor and wound.   Allergic/Immunologic: Negative for environmental allergies, food allergies and immunocompromised state.   Neurological: Negative for dizziness, tremors, seizures, syncope, facial asymmetry, speech difficulty, weakness, light-headedness, numbness and headaches.   Hematological: Negative for adenopathy. Does not bruise/bleed easily.   Psychiatric/Behavioral: Negative for agitation, behavioral problems, confusion, decreased concentration, dysphoric mood, hallucinations, self-injury, sleep disturbance and suicidal ideas. The patient is not nervous/anxious and is not hyperactive.         Objective:     /88 (BP Location: Left arm, Patient Position: Sitting, Cuff Size: Large Adult)   Pulse 91   Ht 177.8 cm (70\")   Wt 79.1 kg (174 lb 6.4 oz)   SpO2 98%   BMI 25.02 kg/m²     Physical Exam   Constitutional: He is oriented to person, place, and time. He appears well-developed and well-nourished. He is cooperative.   HENT:   Head: Normocephalic and atraumatic.   Right Ear: External ear normal.   Left Ear: External ear normal.   Nose: Nose normal.   Mouth/Throat: Oropharynx is clear and moist. No oropharyngeal exudate.   Eyes: Pupils are equal, round, and reactive to light. Conjunctivae and EOM are normal. No scleral icterus. Right eye exhibits normal extraocular motion. Left eye exhibits normal extraocular motion.   Neck: Neck supple. No JVD present. No muscular tenderness present. No tracheal deviation, no edema and no erythema present. No thyromegaly present.   Cardiovascular: Normal rate, regular rhythm, normal heart sounds and intact distal pulses. Exam reveals no gallop " and no friction rub.   No murmur heard.  Pulmonary/Chest: Effort normal and breath sounds normal. No stridor. No respiratory distress. He has no decreased breath sounds. He has no wheezes. He has no rhonchi. He has no rales. He exhibits no tenderness.   Abdominal: Soft. Bowel sounds are normal. He exhibits no distension and no mass. There is no hepatomegaly. There is no tenderness. There is no rebound and no guarding. No hernia.   Musculoskeletal: Normal range of motion. He exhibits no edema, tenderness or deformity.     Vascular Status -  His right foot exhibits normal foot vasculature . His left foot exhibits normal foot vasculature .  Lymphadenopathy:     He has no cervical adenopathy.   Neurological: He is alert and oriented to person, place, and time. He has normal reflexes. No cranial nerve deficit. He exhibits normal muscle tone. Coordination normal.   Skin: Skin is warm. No rash noted. No erythema. No pallor.   Psychiatric: He has a normal mood and affect. His behavior is normal. Judgment and thought content normal.   Nursing note and vitals reviewed.      Lab Review    Results for orders placed or performed in visit on 12/03/19   Hemoglobin A1c   Result Value Ref Range    Hemoglobin A1C 7.3            Assessment/Plan       ICD-10-CM ICD-9-CM   1. Type 2 diabetes mellitus without complication, without long-term current use of insulin (CMS/[facility]) E11.9 250.00   2. Hypertension associated with diabetes (CMS/HCC) E11.59 250.80    I10 401.9   3. Mixed diabetic hyperlipidemia associated with type 2 diabetes mellitus (CMS/[facility]) E11.69 250.80    E78.2 272.2   4. Vitamin D deficiency E55.9 268.9   5. Coronary artery disease involving native heart without angina pectoris, unspecified vessel or lesion type I25.10 414.01   6. Diabetic polyneuropathy associated with type 2 diabetes mellitus (CMS/[facility]) E11.42 250.60     357.2   7. Hypogonadism in male E29.1 257.2       Glycemic Management:      Lab Results   Component Value  Date    HGBA1C 7.3 11/11/2019    HGBA1C 7.3 05/07/2019    HGBA1C 7 11/06/2017     Lab Results   Component Value Date    CREATININE 1.15 04/10/2017           Metformin 500 mg tablets once a day - stop since diarrhea , we may consider in the future     Januvia 100 mg before breakfast  ozempic resulted in side effects      Jardiance 10 mg before bkfast   GFR 59- 60    Add actos 15 mg daily     Neuropathy - neurontin 200 mg qhs before , off of it . Dr Green advised against it     ----------------------------------------------------------      Lipid Management    Was on  lipitor 80 mg qhs and zetia 10 mg daily  But this resulted in myalgias      on repatha again and effective     Tg elevation, stop Dr. Levin         Blood Pressure Management  Vitals:    09/03/20 1024   BP: 148/88   Pulse: 91   SpO2: 98%       atenolol 25 mg tablet, 1 tablet(s) by mouth daily - stopped    No on losartan 25 mg daily      Preventive Care:  Patient is not smoking     Bone Health  12-14 nl DXA         vit D 50 th u q 1 w         Mild elevation of calcium , monitor trend     Other Diabetes Related Aspects     Nl TSH and B12 from 2019     --    Hypogonadism     Has bph controlled on flomax     Was on IM testosterone but  stopped due to MI       I reviewed and summarized records from Willis Green MD from [id number] and I reviewed / ordered labs.     No orders of the defined types were placed in this encounter.        A copy of my note was sent to Willis Green MD    Please see my above opinion and suggestions.

## 2020-12-03 ENCOUNTER — OFFICE VISIT (OUTPATIENT)
Dept: ENDOCRINOLOGY | Facility: CLINIC | Age: 70
End: 2020-12-03

## 2020-12-03 VITALS
BODY MASS INDEX: 24.95 KG/M2 | SYSTOLIC BLOOD PRESSURE: 140 MMHG | WEIGHT: 174.3 LBS | OXYGEN SATURATION: 99 % | DIASTOLIC BLOOD PRESSURE: 90 MMHG | HEART RATE: 92 BPM | HEIGHT: 70 IN

## 2020-12-03 DIAGNOSIS — E11.69 MIXED DIABETIC HYPERLIPIDEMIA ASSOCIATED WITH TYPE 2 DIABETES MELLITUS (HCC): ICD-10-CM

## 2020-12-03 DIAGNOSIS — E11.42 DIABETIC POLYNEUROPATHY ASSOCIATED WITH TYPE 2 DIABETES MELLITUS (HCC): ICD-10-CM

## 2020-12-03 DIAGNOSIS — E11.59 HYPERTENSION ASSOCIATED WITH DIABETES (HCC): ICD-10-CM

## 2020-12-03 DIAGNOSIS — I15.2 HYPERTENSION ASSOCIATED WITH DIABETES (HCC): ICD-10-CM

## 2020-12-03 DIAGNOSIS — E55.9 VITAMIN D DEFICIENCY: ICD-10-CM

## 2020-12-03 DIAGNOSIS — E78.2 MIXED DIABETIC HYPERLIPIDEMIA ASSOCIATED WITH TYPE 2 DIABETES MELLITUS (HCC): ICD-10-CM

## 2020-12-03 DIAGNOSIS — E11.9 TYPE 2 DIABETES MELLITUS WITHOUT COMPLICATION, WITHOUT LONG-TERM CURRENT USE OF INSULIN (HCC): Primary | ICD-10-CM

## 2020-12-03 DIAGNOSIS — I25.10 CORONARY ARTERY DISEASE INVOLVING NATIVE HEART WITHOUT ANGINA PECTORIS, UNSPECIFIED VESSEL OR LESION TYPE: ICD-10-CM

## 2020-12-03 PROCEDURE — 99214 OFFICE O/P EST MOD 30 MIN: CPT | Performed by: INTERNAL MEDICINE

## 2020-12-03 NOTE — PROGRESS NOTES
Erick Antonio is a 70 y.o. male who presents for  evaluation of   Chief Complaint   Patient presents with   • Follow-up     3 mo lala type 2             Primary Care / Referring Provider  Willis Green MD    70 y.o.     also T2 DM  constant   relatively well controlled   severity moderate  Has cad, no hf       Had MI , they attributed to testosterone but most likely related to the fact that he wasn't on a statin   He can't tolerate statins.  Now on repatha and LDL effectively reduced     Past Medical History:   Diagnosis Date   • Arthritis    • Benign essential hypertension    • Coronary arteriosclerosis      nonobstructive CAD      • Dyslipidemia    • History of shingles     of the eyes   • Hypercholesterolemia    • Male hypogonadism    • Type 2 diabetes mellitus (CMS/HCC)      Family History   Problem Relation Age of Onset   • Hypertension Other    • Hyperlipidemia Other    • Hypertension Mother    • Cancer Mother    • Heart disease Mother    • Mental illness Mother    • Kidney disease Mother    • Stroke Mother    • Hypertension Father    • Alcohol abuse Father    • Heart disease Father    • Cancer Sister    • Hypertension Brother    • Heart disease Brother    • Diabetes Son      Social History     Tobacco Use   • Smoking status: Never Smoker   • Smokeless tobacco: Never Used   Substance Use Topics   • Alcohol use: No   • Drug use: No         Current Outpatient Medications:   •  aspirin 81 MG chewable tablet, Chew 1 tablet Daily., Disp: 90 tablet, Rfl: 12  •  Evolocumab (Repatha SureClick) 140 MG/ML solution auto-injector, Inject 1 mL under the skin into the appropriate area as directed Every 14 (Fourteen) Days., Disp: 2 pen, Rfl: 0  •  gabapentin (Neurontin) 100 MG capsule, 100 mg po qhs, Disp: 30 capsule, Rfl: 5  •  glucose blood (FREESTYLE LITE) test strip, 1 each by Other route Daily. Use as instructed, Disp: , Rfl:   •  JARDIANCE 10 MG tablet, TAKE ONE TABLET(S) BY MOUTH EVERY DAY BEFORE BREAKFAST., Disp:  90 tablet, Rfl: 0  •  losartan (COZAAR) 25 MG tablet, Take 25 mg by mouth Daily., Disp: , Rfl:   •  nitroglycerin (NITROSTAT) 0.4 MG SL tablet, Place 0.4 mg under the tongue Every 5 (Five) Minutes As Needed for Chest Pain. Take no more than 3 doses in 15 minutes., Disp: , Rfl:   •  ondansetron ODT (ZOFRAN-ODT) 4 MG disintegrating tablet, Take 1 tablet by mouth Every 8 (Eight) Hours As Needed for Nausea or Vomiting., Disp: 45 tablet, Rfl: 11  •  pioglitazone (Actos) 15 MG tablet, Take 1 tablet by mouth Daily., Disp: 30 tablet, Rfl: 11  •  prasugrel (EFFIENT) 10 MG tablet, Take 10 mg by mouth Daily., Disp: , Rfl:   •  prednisoLONE acetate (PRED FORTE) 1 % ophthalmic suspension, 1 drop 4 (Four) Times a Day. 1 drop in affected eye four times a day., Disp: , Rfl:   •  SITagliptin (JANUVIA) 100 MG tablet, Take 100 mg by mouth Daily., Disp: , Rfl:   •  tamsulosin (FLOMAX) 0.4 MG capsule 24 hr capsule, Take 1 capsule by mouth Daily., Disp: , Rfl:   •  trifluridine (VIROPTIC) 1 % ophthalmic solution, As Needed., Disp: , Rfl:   •  vitamin D (ERGOCALCIFEROL) 1.25 MG (68543 UT) capsule capsule, TAKE ONE CAPSULE(S) BY MOUTH EVERY 14 DAYS., Disp: 6 capsule, Rfl: 0    Review of Systems    Review of Systems   Constitutional: Negative for activity change, appetite change, chills, diaphoresis, fatigue, fever and unexpected weight change.   HENT: Negative for congestion, drooling, ear discharge, ear pain, facial swelling, mouth sores, nosebleeds, postnasal drip, rhinorrhea, sinus pressure, sneezing, sore throat, tinnitus, trouble swallowing and voice change.    Eyes: Negative for photophobia, pain, discharge, redness, itching and visual disturbance.   Respiratory: Negative for apnea, cough, choking, chest tightness, shortness of breath, wheezing and stridor.    Cardiovascular: Negative for chest pain, palpitations and leg swelling.   Gastrointestinal: Negative for abdominal distention, abdominal pain, anal bleeding, blood in stool,  "constipation, diarrhea, nausea, rectal pain and vomiting.   Endocrine: Negative for cold intolerance, heat intolerance, polydipsia, polyphagia and polyuria.   Genitourinary: Negative for difficulty urinating, dysuria, enuresis, flank pain, frequency, hematuria and urgency.   Musculoskeletal: Negative for arthralgias, back pain, gait problem, joint swelling, myalgias, neck pain and neck stiffness.   Skin: Negative for color change, pallor and wound.   Allergic/Immunologic: Negative for environmental allergies, food allergies and immunocompromised state.   Neurological: Negative for dizziness, tremors, seizures, syncope, facial asymmetry, speech difficulty, weakness, light-headedness, numbness and headaches.   Hematological: Negative for adenopathy. Does not bruise/bleed easily.   Psychiatric/Behavioral: Negative for agitation, behavioral problems, confusion, decreased concentration, dysphoric mood, hallucinations, self-injury, sleep disturbance and suicidal ideas. The patient is not nervous/anxious and is not hyperactive.         Objective:     /90 (BP Location: Right arm, Patient Position: Sitting, Cuff Size: Large Adult)   Pulse 92   Ht 177.8 cm (70\")   Wt 79.1 kg (174 lb 4.8 oz)   SpO2 99%   BMI 25.01 kg/m²     Physical Exam   Constitutional: He is oriented to person, place, and time. He appears well-developed. He is cooperative.   HENT:   Head: Normocephalic and atraumatic.   Right Ear: External ear normal.   Left Ear: External ear normal.   Nose: Nose normal.   Mouth/Throat: No oropharyngeal exudate.   Eyes: Pupils are equal, round, and reactive to light. Conjunctivae are normal. No scleral icterus. Right eye exhibits normal extraocular motion. Left eye exhibits normal extraocular motion.   Neck: Neck supple. No JVD present. No muscular tenderness present. No tracheal deviation, no edema and no erythema present. No thyromegaly present.   Cardiovascular: Normal rate, regular rhythm and normal heart " sounds. Exam reveals no gallop and no friction rub.   No murmur heard.  Pulmonary/Chest: Effort normal and breath sounds normal. No stridor. No respiratory distress. He has no decreased breath sounds. He has no wheezes. He has no rhonchi. He has no rales. He exhibits no tenderness.   Abdominal: Soft. Bowel sounds are normal. He exhibits no distension and no mass. There is no hepatomegaly. There is no abdominal tenderness. There is no rebound and no guarding. No hernia.   Musculoskeletal: Normal range of motion. No tenderness or deformity.     Vascular Status -  His right foot exhibits normal foot vasculature . His left foot exhibits normal foot vasculature .  Lymphadenopathy:     He has no cervical adenopathy.   Neurological: He is alert and oriented to person, place, and time. He has normal reflexes. No cranial nerve deficit. He exhibits normal muscle tone. Coordination normal.   Skin: Skin is warm. No rash noted. No erythema. No pallor.   Psychiatric: His behavior is normal. Judgment and thought content normal.   Nursing note and vitals reviewed.      Lab Review    Results for orders placed or performed in visit on 12/03/19   Hemoglobin A1c    Specimen: Blood   Result Value Ref Range    Hemoglobin A1C 7.3            Assessment/Plan       ICD-10-CM ICD-9-CM   1. Type 2 diabetes mellitus without complication, without long-term current use of insulin (CMS/[facility])  E11.9 250.00   2. Hypertension associated with diabetes (CMS/HCC)  E11.59 250.80    I10 401.9   3. Mixed diabetic hyperlipidemia associated with type 2 diabetes mellitus (CMS/HCC)  E11.69 250.80    E78.2 272.2   4. Vitamin D deficiency  E55.9 268.9   5. Coronary artery disease involving native heart without angina pectoris, unspecified vessel or lesion type  I25.10 414.01   6. Diabetic polyneuropathy associated with type 2 diabetes mellitus (CMS/[facility])  E11.42 250.60     357.2   7. Hypogonadism in male  E29.1 257.2       Glycemic Management:      Lab Results    Component Value Date    HGBA1C 7.3 11/11/2019    HGBA1C 7.3 05/07/2019    HGBA1C 7 11/06/2017     Lab Results   Component Value Date    CREATININE 1.15 04/10/2017           Metformin 500 mg tablets once a day - stop since diarrhea , we may consider in the future     Januvia 100 mg before breakfast  ozempic resulted in side effects      Jardiance 10 mg before bkfast   GFR 59- 60    Add actos 15 mg daily , Aic 7.9 from Nov 2020     Neuropathy - neurontin 200 mg qhs before , off of it . Dr Green advised against it     ----------------------------------------------------------      Lipid Management    Was on  lipitor 80 mg qhs and zetia 10 mg daily  But this resulted in myalgias      on repatha again and effective     Tg elevation, stop Dr. Levin    Nov 2020 ,        Blood Pressure Management  Vitals:    12/03/20 1049   BP: 140/90   Pulse: 92   SpO2: 99%       atenolol 25 mg tablet, 1 tablet(s) by mouth daily - stopped    No on losartan 25 mg daily      Preventive Care:  Patient is not smoking     Bone Health  12-14 nl DXA         vit D 50 th u q 1 w  , now 5 th units daily        Mild elevation of calcium , monitor trend , persistent at 10.4 but corrected for albumin of 4.0 his calcium is 9.9     Other Diabetes Related Aspects     Nl TSH and B12 from 2019     --    Hypogonadism     Has bph controlled on flomax     Was on IM testosterone but  stopped due to MI       I reviewed and summarized records from Willis Green MD from [id number] and I reviewed / ordered labs.     No orders of the defined types were placed in this encounter.        A copy of my note was sent to Willis Green MD    Please see my above opinion and suggestions.

## 2020-12-09 RX ORDER — EVOLOCUMAB 140 MG/ML
INJECTION, SOLUTION SUBCUTANEOUS
Qty: 1 ML | Refills: 4 | Status: SHIPPED | OUTPATIENT
Start: 2020-12-09 | End: 2020-12-11

## 2020-12-11 RX ORDER — EVOLOCUMAB 140 MG/ML
INJECTION, SOLUTION SUBCUTANEOUS
Qty: 1 ML | Refills: 4 | Status: SHIPPED | OUTPATIENT
Start: 2020-12-11 | End: 2022-06-20

## 2020-12-30 DIAGNOSIS — R07.9 CHEST PAIN, UNSPECIFIED TYPE: Primary | ICD-10-CM

## 2021-01-18 ENCOUNTER — HOSPITAL ENCOUNTER (OUTPATIENT)
Dept: NUCLEAR MEDICINE | Facility: HOSPITAL | Age: 71
Discharge: HOME | End: 2021-01-18

## 2021-01-18 ENCOUNTER — HOSPITAL ENCOUNTER (OUTPATIENT)
Dept: CARDIOLOGY | Facility: HOSPITAL | Age: 71
Discharge: HOME | End: 2021-01-18

## 2021-01-18 DIAGNOSIS — I25.10 ATHEROSCLEROSIS OF NATIVE CORONARY ARTERY OF NATIVE HEART, ANGINA PRESENCE UNSPECIFIED: ICD-10-CM

## 2021-01-18 DIAGNOSIS — Z98.61 HISTORY OF PTCA: ICD-10-CM

## 2021-01-18 DIAGNOSIS — R07.9 CHEST PAIN, UNSPECIFIED TYPE: ICD-10-CM

## 2021-01-18 PROCEDURE — 0 TECHNETIUM SESTAMIBI: Performed by: INTERNAL MEDICINE

## 2021-01-18 PROCEDURE — 93017 CV STRESS TEST TRACING ONLY: CPT

## 2021-01-18 PROCEDURE — 78452 HT MUSCLE IMAGE SPECT MULT: CPT

## 2021-01-18 PROCEDURE — A9500 TC99M SESTAMIBI: HCPCS | Performed by: INTERNAL MEDICINE

## 2021-01-18 PROCEDURE — 25010000002 REGADENOSON 0.4 MG/5ML SOLUTION: Performed by: INTERNAL MEDICINE

## 2021-01-18 RX ORDER — SODIUM CHLORIDE 0.9 % (FLUSH) 0.9 %
10 SYRINGE (ML) INJECTION ONCE
Status: COMPLETED | OUTPATIENT
Start: 2021-01-18 | End: 2021-01-18

## 2021-01-18 RX ADMIN — TECHNETIUM TC 99M SESTAMIBI 1 DOSE: 1 INJECTION INTRAVENOUS at 09:07

## 2021-01-18 RX ADMIN — REGADENOSON 0.4 MG: 0.08 INJECTION, SOLUTION INTRAVENOUS at 09:06

## 2021-01-18 RX ADMIN — SODIUM CHLORIDE, PRESERVATIVE FREE 10 ML: 5 INJECTION INTRAVENOUS at 09:06

## 2021-01-18 RX ADMIN — TECHNETIUM TC 99M SESTAMIBI 1 DOSE: 1 INJECTION INTRAVENOUS at 07:57

## 2021-01-18 NOTE — H&P
Cardiology History and Physical Note        Patient Name: Erick Antonio  Age/Sex: 70 y.o. male  : 1950  MRN: [id number]    Date of Admission : 2021    Primary care Physician: Willis Green MD    Reason for Admission:  Chest pain history of atherosclerotic coronary artery disease status post aborted lateral wall myocardial infarction Lexiscan Cardiolite stress test    Subjective:       Chief Complaint: Chest pain.      History of Present Illness:  Erick Antonio is a 70 y.o. male     Height of 5 feet 10 inches weight of 168 pounds with a mass index of 24 with a past medical history significant for atherosclerotic coronary artery disease with aborted lateral wall myocardial infarction in 2017 with PTCA and stenting of the first obtuse marginal branch with deployment of a 2.5 mm x 23 mm Alpine drug-eluting stent, PTCA and stenting of the mid circumflex artery with deployment of a 3 mm x 12 mm Alpine drug-eluting stent, negative FFR evaluation of the left anterior descending artery in 2017 value of 0.82 treated medically, history of arterial hypertension, hypertensive heart disease, hyperlipidemia, male hypogonadism, type 2 diabetes, family history for coronary artery disease and benign prostatic hypertrophy.    Patient complained of having symptoms of substernal chest discomfort on and off.  Patient was recently evaluated by Dr. Green and and had undergone blood test.  Patient complains of having shoulder discomfort.  Patient on questioning has been evaluated by endocrinology for the poorly controlled diabetes and was started on Actos .  Patient on questioning denies any symptoms of palpitation.  Patient denies any symptoms of lightheaded dizziness or near syncope.    Patient on further questioning denies any fever chill patient denies any hemoptysis hematuria bright of blood per rectum.    Resting electrocardiogram done reveals sinus rhythm at the rate of 77 bpm with nonspecific ST-T  "wave changes.    Patient blood pressure today is 110/78 pulse of 72 regular respiration of 18 oxygen saturation of 97%.    Patient 10 point review of system except for what stated in the history of present illness and negative.    Concurrent  Medical History:  1.  Chest pain.  2.  Atherosclerotic coronary artery disease.  3.  Aborted lateral wall myocardial infarction in February 2017.  4.  Rescue PTCA and stenting of the circumflex obtuse marginal branch with deployment of a 2.5 mm x 23 mm Alpine drug-eluting stent done in February 2017.  5.  PTCA and stenting of the mid circumflex artery with deployment of a 3 mm x 12 mm Alpine drug-eluting stent.  6.  Left anterior descending artery FFR evaluation which was not hemodynamically significant treated medically in April 2017.  7. Arterial hypertension.  8. Hypertensive heart disease.   9. Hyperlipidemia.  10. Noninsulin dependent diabetes.   11. Vitamin D deficiency.   12. Male hypogonadism.   13. Benign prostatic hypertrophy.   14. Obesity.   15. Anxiety.   16.  Arthritis.        Past Surgical History:  1.  Coronary angiogram and PTCA and stenting.  2.  Remote history of trauma to the ankle with surgical intervention.      Family History: Family history significant for premature atherosclerotic coronary artery disease      Social History: No history of tobacco alcohol intake.       Cardiac Risk factor:   1. Male.  2. Arterial hypertension.  3. Hyperlipidemia.  4. Diabetes.  5. Obesity.  6. Family history for coronary artery disease.    Allergies:  Allergies   Allergen Reactions   • Albuterol Unknown - Low Severity     \"closes\" him \"down\"   • Cantaloupe (Diagnostic) Swelling     Throat swelling     • Prevacid [Lansoprazole] Unknown - Low Severity     \"chokes him up\"   • Watermelon Flavor Swelling     Throat swelling       Home Medication::  Prior to Admission medications    Medication Sig Start Date End Date Taking? Authorizing Provider   aspirin 81 MG chewable tablet " Chew 1 tablet Daily. 2/3/17   Jorge Alvarado MD   gabapentin (Neurontin) 100 MG capsule 100 mg po qhs 6/3/20   Lopez Marmolejo MD   glucose blood (FREESTYLE LITE) test strip 1 each by Other route Daily. Use as instructed    Lupe Pablo MD   JARDIANCE 10 MG tablet TAKE ONE TABLET(S) BY MOUTH EVERY DAY BEFORE BREAKFAST. 1/2/18   Lopez Marmolejo MD   losartan (COZAAR) 25 MG tablet Take 25 mg by mouth Daily.    Lupe Pablo MD   nitroglycerin (NITROSTAT) 0.4 MG SL tablet Place 0.4 mg under the tongue Every 5 (Five) Minutes As Needed for Chest Pain. Take no more than 3 doses in 15 minutes.    Lupe Pablo MD   ondansetron ODT (ZOFRAN-ODT) 4 MG disintegrating tablet Take 1 tablet by mouth Every 8 (Eight) Hours As Needed for Nausea or Vomiting. 2/19/19   Lopez Marmolejo MD   pioglitazone (Actos) 15 MG tablet Take 1 tablet by mouth Daily. 9/3/20 9/3/21  Lopez Marmolejo MD   prasugrel (EFFIENT) 10 MG tablet Take 10 mg by mouth Daily.    Lupe Pablo MD   prednisoLONE acetate (PRED FORTE) 1 % ophthalmic suspension 1 drop 4 (Four) Times a Day. 1 drop in affected eye four times a day.    Lupe Pablo MD   Repatha SureClick solution auto-injector SureClick injection INJECT 1 PEN (140MG) UNDER THE SKIN EVERY 14 DAYS 12/11/20   Lopez Marmolejo MD   SITagliptin (JANUVIA) 100 MG tablet Take 100 mg by mouth Daily.    Lupe Pablo MD   tamsulosin (FLOMAX) 0.4 MG capsule 24 hr capsule Take 1 capsule by mouth Daily.    Lupe Pablo MD   trifluridine (VIROPTIC) 1 % ophthalmic solution As Needed.    Lupe Pablo MD   vitamin D (ERGOCALCIFEROL) 1.25 MG (71271 UT) capsule capsule TAKE ONE CAPSULE(S) BY MOUTH EVERY 14 DAYS. 8/25/20   Lopez Marmolejo MD         Review of Systems   Constitutional: Negative for chills, fever and unexpected weight change.   HENT: Negative for hearing loss and nosebleeds.    Eyes:  Negative for visual disturbance.   Respiratory: Positive for chest tightness and shortness of breath. Negative for cough and wheezing.    Cardiovascular: Positive for chest pain. Negative for palpitations and leg swelling.   Gastrointestinal: Negative for abdominal pain, blood in stool, constipation, diarrhea, nausea and vomiting.   Endocrine: Negative for cold intolerance, heat intolerance, polydipsia, polyphagia and polyuria.   Genitourinary: Negative for hematuria.   Musculoskeletal: Negative for joint swelling, myalgias and neck pain.   Skin: Negative for color change, rash and wound.   Neurological: Negative for dizziness, seizures, syncope, light-headedness, numbness and headaches.   Hematological: Does not bruise/bleed easily.         Objective:     Blood pressure 110/78 pulse of 72 respiration of 18 oxygen saturation of 97%    Constitutional:       Appearance: Well-developed.   Eyes:      General: No scleral icterus.     Conjunctiva/sclera: Conjunctivae normal.      Pupils: Pupils are equal, round, and reactive to light.   HENT:      Head: Normocephalic and atraumatic.      Left Ear: External ear normal.      Nose: Nose normal.   Neck:      Musculoskeletal: Normal range of motion and neck supple.      Thyroid: No thyromegaly.      Vascular: No JVD.      Trachea: No tracheal deviation.      Lymphadenopathy: No cervical adenopathy.   Pulmonary:      Breath sounds: Normal breath sounds. No stridor.   Cardiovascular:      Normal rate. Regular rhythm.   Abdominal:      General: Bowel sounds are normal.   Musculoskeletal: Normal range of motion.   Skin:     General: Skin is warm and dry.   Neurological:      Mental Status: Alert and oriented to person, place, and time.      Deep Tendon Reflexes: Reflexes are normal and symmetric.   Psychiatric:         Behavior: Behavior normal.         Thought Content: Thought content normal.         Judgment: Judgment normal.         Lab Review:           Invalid input(s):  LABALBU, PROT                                    EKG:   ECG/EMG Results (last 24 hours)     ** No results found for the last 24 hours. **          Imaging:  Imaging Results (Last 24 Hours)     ** No results found for the last 24 hours. **          I personally viewed and interpreted the patient's EKG/Telemetry data.    Assessment:   1.  Chest pain.  2.  Atherosclerotic coronary artery disease.  3.  PTCA and stenting of the circumflex obtuse marginal branch in February 2017.  4.  Negative FFR evaluation of the left anterior descending artery treated medically.  5.  Arterial hypertension.  6.  Hypertensive heart disease.          Plan:   1.  Chest pain.  Patient does have history of documented atherosclerotic coronary artery disease with previous aborted lateral wall myocardial infarction.  Patient clearly has symptoms of chest pain which is suggestive of angina.  Due to the patient's symptom complex with previous history of PTCA and stenting of the circumflex and the obtuse marginal branch and negative FFR evaluation of the left anterior descending artery done in 2017 patient at the present time has been recommended to undergo a Lexiscan Cardiolite stress test.  Risk-benefit treatment option for the Lexiscan Cardiolite stress test were discussed with the patient and an informed consent was obtained.  Plan was to proceed with a Lexiscan Cardiolite stress test.    2.  Arterial hypertension.  Patient blood pressure is currently well controlled.  Patient would be continued on the present dose of the losartan 25 mg twice a day.  Patient has been counseled to decrease her salt intake.    3.  Hypertensive heart disease with nonrheumatic mild mitral and nonrheumatic mild tricuspid regurgitation.  Clinically at the present time patient is not in congestive heart failure.    4.  Hyperlipidemia.  Patient has been counseled on low-fat low-cholesterol diet.  Patient is currently on Repatha 140 mg every 2 weeks.  Patient is to  undergo lipid profile including liver function test evaluation.    5.  Vitamin D deficiency.  Patient is currently on vitamin D supplement.    6.  Benign prostatic hypertrophy.  Patient is currently on Flomax.    7.  Male hypogonadism.  Patient is currently on testosterone supplement and has been followed by Dr. Rojo    The above plan of management were discussed with the patient.      Time: time spent in face-to-face evaluation of greater than 55 minutes and interacting and formulating examining and discussing the plan with the patient with 50% of greater time spent in face-to-face interaction.    Electronically signed by Jorge Alvarado MD, 01/17/21, 9:38 PM CST.      Dictated utilizing Dragon dictation.

## 2021-02-01 LAB
BH CV STRESS BP STAGE 1: NORMAL
BH CV STRESS COMMENTS STAGE 1: NORMAL
BH CV STRESS DOSE REGADENOSON STAGE 1: 0.4
BH CV STRESS DURATION MIN STAGE 1: 0
BH CV STRESS DURATION SEC STAGE 1: 10
BH CV STRESS HR STAGE 1: 103
BH CV STRESS PROTOCOL 1: NORMAL
BH CV STRESS RECOVERY BP: NORMAL MMHG
BH CV STRESS RECOVERY HR: 98 BPM
BH CV STRESS STAGE 1: 1
LV EF NUC BP: 61 %
MAXIMAL PREDICTED HEART RATE: 151 BPM
PERCENT MAX PREDICTED HR: 68.87 %
STRESS BASELINE BP: NORMAL MMHG
STRESS BASELINE HR: 71 BPM
STRESS PERCENT HR: 81 %
STRESS POST ESTIMATED WORKLOAD: 1 METS
STRESS POST PEAK BP: NORMAL MMHG
STRESS POST PEAK HR: 104 BPM
STRESS TARGET HR: 128 BPM

## 2021-03-10 ENCOUNTER — TELEPHONE (OUTPATIENT)
Dept: ENDOCRINOLOGY | Facility: CLINIC | Age: 71
End: 2021-03-10

## 2021-03-29 DIAGNOSIS — E11.42 DIABETIC POLYNEUROPATHY ASSOCIATED WITH TYPE 2 DIABETES MELLITUS (HCC): ICD-10-CM

## 2021-03-30 RX ORDER — GABAPENTIN 100 MG/1
CAPSULE ORAL
Qty: 30 CAPSULE | Refills: 0 | Status: SHIPPED | OUTPATIENT
Start: 2021-03-30 | End: 2021-04-02 | Stop reason: SDUPTHER

## 2021-04-02 ENCOUNTER — TELEMEDICINE (OUTPATIENT)
Dept: ENDOCRINOLOGY | Facility: CLINIC | Age: 71
End: 2021-04-02

## 2021-04-02 DIAGNOSIS — E11.65 TYPE 2 DIABETES MELLITUS WITH HYPERGLYCEMIA, WITH LONG-TERM CURRENT USE OF INSULIN (HCC): Primary | ICD-10-CM

## 2021-04-02 DIAGNOSIS — E11.42 DIABETIC POLYNEUROPATHY ASSOCIATED WITH TYPE 2 DIABETES MELLITUS (HCC): ICD-10-CM

## 2021-04-02 DIAGNOSIS — I15.2 HYPERTENSION ASSOCIATED WITH DIABETES (HCC): ICD-10-CM

## 2021-04-02 DIAGNOSIS — E55.9 VITAMIN D DEFICIENCY: ICD-10-CM

## 2021-04-02 DIAGNOSIS — Z79.4 TYPE 2 DIABETES MELLITUS WITH HYPERGLYCEMIA, WITH LONG-TERM CURRENT USE OF INSULIN (HCC): Primary | ICD-10-CM

## 2021-04-02 DIAGNOSIS — E11.59 HYPERTENSION ASSOCIATED WITH DIABETES (HCC): ICD-10-CM

## 2021-04-02 DIAGNOSIS — E11.69 MIXED DIABETIC HYPERLIPIDEMIA ASSOCIATED WITH TYPE 2 DIABETES MELLITUS (HCC): ICD-10-CM

## 2021-04-02 DIAGNOSIS — E78.2 MIXED DIABETIC HYPERLIPIDEMIA ASSOCIATED WITH TYPE 2 DIABETES MELLITUS (HCC): ICD-10-CM

## 2021-04-02 LAB — HBA1C MFR BLD: 7.5 %

## 2021-04-02 PROCEDURE — 99214 OFFICE O/P EST MOD 30 MIN: CPT | Performed by: INTERNAL MEDICINE

## 2021-04-02 RX ORDER — GABAPENTIN 100 MG/1
CAPSULE ORAL
Qty: 60 CAPSULE | Refills: 6 | Status: SHIPPED | OUTPATIENT
Start: 2021-04-02 | End: 2021-06-08 | Stop reason: SDUPTHER

## 2021-04-02 RX ORDER — ROSUVASTATIN CALCIUM 5 MG/1
TABLET, COATED ORAL
Qty: 12 TABLET | Refills: 3 | Status: SHIPPED | OUTPATIENT
Start: 2021-04-02 | End: 2021-08-16 | Stop reason: SDUPTHER

## 2021-04-02 NOTE — PROGRESS NOTES
Erick Antonio is a 70 y.o. male who presents for  evaluation of   Diabetes                                        This was a Telehealth Encounter. Benefits and Disadvantages of a Telehealth Visit were discussed and accepted by patient. .  Patient agreed to receive service through Telehealth visit as patient is being compliant with social distancing recommendations imparted by CDC.     You have chosen to receive care through a telehealth visit.  Do you consent to use a video/audio connection for your medical care today? Yes        Primary Care / Referring Provider  Willis Green MD    70 y.o.      T2 DM  constant   relatively well controlled but drinking Dr. Pepper  severity moderate  Has cad, no hf       Had MI  He can't tolerate statins.  Now on repatha and LDL reduced but not at goal     Patient Active Problem List    Diagnosis    • Diabetic polyneuropathy associated with type 2 diabetes mellitus (CMS/[facility]) [E11.42]    • Benign prostatic hyperplasia without lower urinary tract symptoms [N40.0]    • Hyperlipidemia [E78.5]    • Hypogonadism in male [E29.1]    • Type 2 diabetes mellitus without complication, without long-term current use of insulin (CMS/[facility]) [E11.9]    • Hypertension associated with diabetes (CMS/[facility]) [E11.59, I15.2]    • Mixed diabetic hyperlipidemia associated with type 2 diabetes mellitus (CMS/[facility]) [E11.69, E78.2]      Current Outpatient Medications   Medication Instructions   • aspirin 81 mg, Oral, Daily   • gabapentin (NEURONTIN) 100 MG capsule Take up to 2 capsules at night   • glucose blood (FREESTYLE LITE) test strip 1 each, Other, Daily, Use as instructed    • JARDIANCE 10 MG tablet TAKE ONE TABLET(S) BY MOUTH EVERY DAY BEFORE BREAKFAST.   • losartan (COZAAR) 25 mg, Oral, Daily   • nitroglycerin (NITROSTAT) 0.4 mg, Sublingual, Every 5 Minutes PRN, Take no more than 3 doses in 15 minutes.    • ondansetron ODT (ZOFRAN-ODT) 4 mg, Oral, Every 8 Hours PRN   • pioglitazone (ACTOS) 15  mg, Oral, Daily   • prasugrel (EFFIENT) 10 mg, Oral, Daily   • prednisoLONE acetate (PRED FORTE) 1 % ophthalmic suspension 1 drop, 4 Times Daily, 1 drop in affected eye four times a day.    • Repatha SureClick solution auto-injector SureClick injection INJECT 1 PEN (140MG) UNDER THE SKIN EVERY 14 DAYS   • rosuvastatin (Crestor) 5 MG tablet Take 1 tablet once weekly   • SITagliptin (JANUVIA) 100 mg, Oral, Daily   • tamsulosin (FLOMAX) 0.4 MG capsule 24 hr capsule 1 capsule, Oral, Daily   • trifluridine (VIROPTIC) 1 % ophthalmic solution As Needed   • vitamin D (ERGOCALCIFEROL) 1.25 MG (07577 UT) capsule capsule TAKE ONE CAPSULE(S) BY MOUTH EVERY 14 DAYS.       Review of Systems  None      Objective:     There were no vitals taken for this visit.    Physical Exam   HENT:   Head: Normocephalic.   Musculoskeletal:      Right lower leg: No edema.      Left lower leg: No edema.   Neurological: He is alert.       Lab Review            Assessment/Plan       ICD-10-CM ICD-9-CM   1. Diabetic polyneuropathy associated with type 2 diabetes mellitus (CMS/[facility])  E11.42 250.60     357.2       Glycemic Management:      Lab Results   Component Value Date    HGBA1C 7.5 04/01/2021    HGBA1C 7.3 11/11/2019    HGBA1C 7.3 05/07/2019     Lab Results   Component Value Date    CREATININE 1.15 04/10/2017       Stop Dr. Pepper     Metformin 500 mg tablets once a day - stop since diarrhea , we may consider in the future     Januvia 100 mg before breakfast  ozempic resulted in side effects      Jardiance 10 mg before bkfast   GFR 59- 60 - 57      actos 15 mg daily       Neuropathy - neurontin 100 mg qhs  Partially effective    Increase up to 200 mg qhs   ----------------------------------------------------------      Lipid Management    Was on  lipitor 80 mg qhs and zetia 10 mg daily that couldn't tolerata    On repatha and     Add crestor 5 mg weekly            Blood Pressure Management  There were no vitals filed for this  visit.    atenolol 25 mg tablet, 1 tablet(s) by mouth daily - stopped    Now on losartan 25 mg daily      Preventive Care:  Patient is not smoking     B[facility]  12-14 nl DXA         vit D 50 th u q 1 w  , now 5 th units daily        Mild elevation of calcium , monitor trend , persistent at 10.4 but corrected for albumin of 4.0 his calcium is 9.9     Other Diabetes Related Aspects     Nl TSH and B12 from 2019     --    Hypogonadism     Has bph controlled on flomax     Was on IM testosterone but  stopped due to MI      subclinical hypothyroidism    Last TSH 4.56     No need to treat     Orders Placed This Encounter   Procedures   • Hemoglobin A1c     This order was created through External Result Entry         A copy of my note was sent to Willis Green MD    Please see my above opinion and suggestions.

## 2021-04-05 DIAGNOSIS — I25.10 CORONARY ARTERY DISEASE INVOLVING NATIVE HEART WITHOUT ANGINA PECTORIS, UNSPECIFIED VESSEL OR LESION TYPE: ICD-10-CM

## 2021-04-05 DIAGNOSIS — I15.2 HYPERTENSION ASSOCIATED WITH DIABETES (HCC): ICD-10-CM

## 2021-04-05 DIAGNOSIS — E11.59 HYPERTENSION ASSOCIATED WITH DIABETES (HCC): ICD-10-CM

## 2021-04-05 DIAGNOSIS — E11.9 TYPE 2 DIABETES MELLITUS WITHOUT COMPLICATION, WITHOUT LONG-TERM CURRENT USE OF INSULIN (HCC): ICD-10-CM

## 2021-04-05 DIAGNOSIS — E11.42 DIABETIC POLYNEUROPATHY ASSOCIATED WITH TYPE 2 DIABETES MELLITUS (HCC): ICD-10-CM

## 2021-04-05 DIAGNOSIS — E78.2 MIXED DIABETIC HYPERLIPIDEMIA ASSOCIATED WITH TYPE 2 DIABETES MELLITUS (HCC): ICD-10-CM

## 2021-04-05 DIAGNOSIS — E55.9 VITAMIN D DEFICIENCY: ICD-10-CM

## 2021-04-05 DIAGNOSIS — E11.69 MIXED DIABETIC HYPERLIPIDEMIA ASSOCIATED WITH TYPE 2 DIABETES MELLITUS (HCC): ICD-10-CM

## 2021-06-07 ENCOUNTER — TELEPHONE (OUTPATIENT)
Dept: ENDOCRINOLOGY | Facility: CLINIC | Age: 71
End: 2021-06-07

## 2021-06-07 NOTE — TELEPHONE ENCOUNTER
The repatha is requiring a PA, Sola left us an unhappy voicemail about not responding to their fax, and stating that the pt has been calling for refills about this, says our office needs to handle this. Their call back number is [phone]. They said they are refaxing paperwork.

## 2021-06-07 NOTE — TELEPHONE ENCOUNTER
Pt left a vm stating that nobody has sent him any Repatha and he is needing to know what is going on with this.

## 2021-06-07 NOTE — TELEPHONE ENCOUNTER
Pt left a vm and was very aggravated that he has been trying to get his cholestrial medicine and is not due to take his next shot Thursday and still hasn't heard anything and asks that someone call him please . Didn't say what the medicine was on the VM

## 2021-06-08 DIAGNOSIS — E11.42 DIABETIC POLYNEUROPATHY ASSOCIATED WITH TYPE 2 DIABETES MELLITUS (HCC): ICD-10-CM

## 2021-06-08 RX ORDER — GABAPENTIN 100 MG/1
CAPSULE ORAL
Qty: 60 CAPSULE | Refills: 6 | Status: SHIPPED | OUTPATIENT
Start: 2021-06-08 | End: 2021-08-16 | Stop reason: SDUPTHER

## 2021-06-10 ENCOUNTER — TELEPHONE (OUTPATIENT)
Dept: ENDOCRINOLOGY | Facility: CLINIC | Age: 71
End: 2021-06-10

## 2021-06-28 ENCOUNTER — TELEPHONE (OUTPATIENT)
Dept: ENDOCRINOLOGY | Facility: CLINIC | Age: 71
End: 2021-06-28

## 2021-06-30 ENCOUNTER — DOCUMENTATION (OUTPATIENT)
Dept: ENDOCRINOLOGY | Facility: CLINIC | Age: 71
End: 2021-06-30

## 2021-07-02 ENCOUNTER — DOCUMENTATION (OUTPATIENT)
Dept: ENDOCRINOLOGY | Facility: CLINIC | Age: 71
End: 2021-07-02

## 2021-07-23 ENCOUNTER — TELEPHONE (OUTPATIENT)
Dept: ENDOCRINOLOGY | Facility: CLINIC | Age: 71
End: 2021-07-23

## 2021-07-26 DIAGNOSIS — E11.69 MIXED DIABETIC HYPERLIPIDEMIA ASSOCIATED WITH TYPE 2 DIABETES MELLITUS (HCC): ICD-10-CM

## 2021-07-26 DIAGNOSIS — E55.9 VITAMIN D DEFICIENCY: ICD-10-CM

## 2021-07-26 DIAGNOSIS — E11.9 TYPE 2 DIABETES MELLITUS WITHOUT COMPLICATION, WITHOUT LONG-TERM CURRENT USE OF INSULIN (HCC): Primary | ICD-10-CM

## 2021-07-26 DIAGNOSIS — E78.2 MIXED DIABETIC HYPERLIPIDEMIA ASSOCIATED WITH TYPE 2 DIABETES MELLITUS (HCC): ICD-10-CM

## 2021-08-16 ENCOUNTER — TELEMEDICINE (OUTPATIENT)
Dept: ENDOCRINOLOGY | Facility: CLINIC | Age: 71
End: 2021-08-16

## 2021-08-16 DIAGNOSIS — E11.42 DIABETIC POLYNEUROPATHY ASSOCIATED WITH TYPE 2 DIABETES MELLITUS (HCC): ICD-10-CM

## 2021-08-16 DIAGNOSIS — I15.2 HYPERTENSION ASSOCIATED WITH DIABETES (HCC): Primary | ICD-10-CM

## 2021-08-16 DIAGNOSIS — I25.10 CORONARY ARTERY DISEASE INVOLVING NATIVE HEART WITHOUT ANGINA PECTORIS, UNSPECIFIED VESSEL OR LESION TYPE: ICD-10-CM

## 2021-08-16 DIAGNOSIS — E11.59 HYPERTENSION ASSOCIATED WITH DIABETES (HCC): Primary | ICD-10-CM

## 2021-08-16 DIAGNOSIS — E55.9 VITAMIN D DEFICIENCY: ICD-10-CM

## 2021-08-16 DIAGNOSIS — E11.9 TYPE 2 DIABETES MELLITUS WITHOUT COMPLICATION, WITHOUT LONG-TERM CURRENT USE OF INSULIN (HCC): ICD-10-CM

## 2021-08-16 LAB — HBA1C MFR BLD: 7.4 %

## 2021-08-16 PROCEDURE — 99214 OFFICE O/P EST MOD 30 MIN: CPT | Performed by: INTERNAL MEDICINE

## 2021-08-16 RX ORDER — ROSUVASTATIN CALCIUM 5 MG/1
TABLET, COATED ORAL
Qty: 90 TABLET | Refills: 3 | Status: SHIPPED | OUTPATIENT
Start: 2021-08-16

## 2021-08-16 RX ORDER — GABAPENTIN 100 MG/1
CAPSULE ORAL
Qty: 180 CAPSULE | Refills: 1 | Status: SHIPPED | OUTPATIENT
Start: 2021-08-16 | End: 2022-08-03 | Stop reason: SDUPTHER

## 2021-08-16 RX ORDER — PIOGLITAZONEHYDROCHLORIDE 15 MG/1
15 TABLET ORAL DAILY
Qty: 90 TABLET | Refills: 3 | Status: SHIPPED | OUTPATIENT
Start: 2021-08-16 | End: 2022-11-07

## 2021-08-16 NOTE — PROGRESS NOTES
Erick Antonio is a 70 y.o. male who presents for  evaluation of   Diabetes                                        This was a Telehealth Encounter. Benefits and Disadvantages of a Telehealth Visit were discussed and accepted by patient. .  Patient agreed to receive service through Telehealth visit as patient is being compliant with social distancing recommendations imparted by CDC.     You have chosen to receive care through a telehealth visit.  Do you consent to use a video/audio connection for your medical care today? Yes        Primary Care / Referring Provider  Willis Green MD    70 y.o.      T2 DM  constant   relatively well controlled but drinking Dr. Pepper  severity moderate  Has cad, no hf       Had MI  He can't tolerate statins.  Now on repatha and LDL reduced but not at goal         Review of Systems  None      Objective:     There were no vitals taken for this visit.    Physical Exam   HENT:   Head: Normocephalic.   Musculoskeletal:      Right lower leg: No edema.      Left lower leg: No edema.   Neurological: He is alert.       Lab Review            Assessment/Plan       ICD-10-CM ICD-9-CM   1. Hypertension associated with diabetes (CMS/[facility])  E11.59 250.80    I15.2 401.9   2. Diabetic polyneuropathy associated with type 2 diabetes mellitus (CMS/[facility])  E11.42 250.60     357.2   3. Type 2 diabetes mellitus without complication, without long-term current use of insulin (CMS/[facility])  E11.9 250.00   4. Vitamin D deficiency  E55.9 268.9   5. Coronary artery disease involving native heart without angina pectoris, unspecified vessel or lesion type  I25.10 414.01       Glycemic Management:      Lab Results   Component Value Date    HGBA1C 7.4 07/27/2021    HGBA1C 7.5 04/01/2021    HGBA1C 7.3 11/11/2019     Lab Results   Component Value Date    CREATININE 1.15 04/10/2017       Stop Dr. Pepper     Metformin 500 mg tablets once a day - stop since diarrhea , we may consider in the future     Januvia 100 mg  before breakfast  ozempic resulted in side effects      Jardiance 10 mg before bkfast   GFR 59- 60 - 57      actos 15 mg daily       Neuropathy - neurontin 100 mg qhs  Partially effective    Increase up to 200 mg qhs   ----------------------------------------------------------      Lipid Management    Was on  lipitor 80 mg qhs and zetia 10 mg daily that couldn't tolerata    On repatha and      Add crestor 5 mg weekly - now 107   Twice weekly            Blood Pressure Management  There were no vitals filed for this visit.    atenolol 25 mg tablet, 1 tablet(s) by mouth daily - stopped    Now on losartan 25 mg daily      Preventive Care:  Patient is not smoking     Bone Health  12-14 nl DXA         vit D 50 th u q 1 w  , now 5 th units daily        Mild elevation of calcium , monitor trend , persistent at 10.4 but corrected for albumin of 4.0 his calcium is 9.9     Other Diabetes Related Aspects     Nl TSH and B12     --    Hypogonadism     Has bph controlled on flomax     Was on IM testosterone but  stopped due to MI      subclinical hypothyroidism    Last TSH 4.56     No need to treat     Orders Placed This Encounter   Procedures   • CBC Auto Differential     Standing Status:   Future     Standing Expiration Date:   8/16/2022     Order Specific Question:   Release to patient     Answer:   Immediate   • Comprehensive Metabolic Panel     Standing Status:   Future     Standing Expiration Date:   8/16/2022     Order Specific Question:   Release to patient     Answer:   Immediate   • Hemoglobin A1c     Standing Status:   Future     Standing Expiration Date:   8/16/2022     Order Specific Question:   Release to patient     Answer:   Immediate   • Lipid Panel     Standing Status:   Future     Standing Expiration Date:   8/16/2022   • Microalbumin / Creatinine Urine Ratio - Urine, Clean Catch     Standing Status:   Future     Standing Expiration Date:   8/16/2022     Order Specific Question:   Release to patient      Answer:   Immediate   • TSH     Standing Status:   Future     Standing Expiration Date:   8/16/2022     Order Specific Question:   Release to patient     Answer:   Immediate   • Vitamin B12     Standing Status:   Future     Standing Expiration Date:   8/16/2022     Order Specific Question:   Release to patient     Answer:   Immediate   • Vitamin D 25 Hydroxy     Standing Status:   Future     Standing Expiration Date:   8/16/2022     Order Specific Question:   Release to patient     Answer:   Immediate   • Hemoglobin A1c     This order was created through External Result Entry     Order Specific Question:   Release to patient     Answer:   Immediate         A copy of my note was sent to Willis Green MD    Please see my above opinion and suggestions.

## 2021-12-20 ENCOUNTER — DOCUMENTATION (OUTPATIENT)
Dept: ENDOCRINOLOGY | Facility: CLINIC | Age: 71
End: 2021-12-20

## 2021-12-30 ENCOUNTER — TELEPHONE (OUTPATIENT)
Dept: ENDOCRINOLOGY | Facility: CLINIC | Age: 71
End: 2021-12-30

## 2022-04-26 ENCOUNTER — TELEPHONE (OUTPATIENT)
Dept: ENDOCRINOLOGY | Facility: CLINIC | Age: 72
End: 2022-04-26

## 2022-06-20 RX ORDER — EVOLOCUMAB 140 MG/ML
INJECTION, SOLUTION SUBCUTANEOUS
Qty: 2 PEN | Refills: 11 | Status: SHIPPED | OUTPATIENT
Start: 2022-06-20

## 2022-07-07 ENCOUNTER — DOCUMENTATION (OUTPATIENT)
Dept: ENDOCRINOLOGY | Facility: CLINIC | Age: 72
End: 2022-07-07

## 2022-08-03 ENCOUNTER — OFFICE VISIT (OUTPATIENT)
Dept: ENDOCRINOLOGY | Facility: CLINIC | Age: 72
End: 2022-08-03

## 2022-08-03 VITALS
BODY MASS INDEX: 25.48 KG/M2 | WEIGHT: 178 LBS | DIASTOLIC BLOOD PRESSURE: 78 MMHG | OXYGEN SATURATION: 97 % | HEIGHT: 70 IN | SYSTOLIC BLOOD PRESSURE: 140 MMHG | HEART RATE: 70 BPM

## 2022-08-03 DIAGNOSIS — I15.2 HYPERTENSION ASSOCIATED WITH DIABETES: ICD-10-CM

## 2022-08-03 DIAGNOSIS — E78.2 MIXED DIABETIC HYPERLIPIDEMIA ASSOCIATED WITH TYPE 2 DIABETES MELLITUS: ICD-10-CM

## 2022-08-03 DIAGNOSIS — E03.8 SUBCLINICAL HYPOTHYROIDISM: ICD-10-CM

## 2022-08-03 DIAGNOSIS — E11.42 DIABETIC POLYNEUROPATHY ASSOCIATED WITH TYPE 2 DIABETES MELLITUS: ICD-10-CM

## 2022-08-03 DIAGNOSIS — E11.65 TYPE 2 DIABETES MELLITUS WITH HYPERGLYCEMIA, WITHOUT LONG-TERM CURRENT USE OF INSULIN: Primary | ICD-10-CM

## 2022-08-03 DIAGNOSIS — N18.31 CHRONIC KIDNEY DISEASE (CKD) STAGE G3A/A1, MODERATELY DECREASED GLOMERULAR FILTRATION RATE (GFR) BETWEEN 45-59 ML/MIN/1.73 SQUARE METER AND ALBUMINURIA CREATININE RATIO LESS THAN 30 MG/G (CMS/H*: ICD-10-CM

## 2022-08-03 DIAGNOSIS — I25.10 CORONARY ARTERY DISEASE INVOLVING NATIVE CORONARY ARTERY OF NATIVE HEART WITHOUT ANGINA PECTORIS: ICD-10-CM

## 2022-08-03 DIAGNOSIS — E11.59 HYPERTENSION ASSOCIATED WITH DIABETES: ICD-10-CM

## 2022-08-03 DIAGNOSIS — E11.69 MIXED DIABETIC HYPERLIPIDEMIA ASSOCIATED WITH TYPE 2 DIABETES MELLITUS: ICD-10-CM

## 2022-08-03 LAB
HBA1C MFR BLD: 7.9 %
LDLC SERPL CALC-MCNC: 86 MG/DL

## 2022-08-03 PROCEDURE — 99214 OFFICE O/P EST MOD 30 MIN: CPT | Performed by: INTERNAL MEDICINE

## 2022-08-03 RX ORDER — CARVEDILOL 3.12 MG/1
3.12 TABLET ORAL 2 TIMES DAILY WITH MEALS
COMMUNITY

## 2022-08-03 RX ORDER — GABAPENTIN 100 MG/1
CAPSULE ORAL
Qty: 180 CAPSULE | Refills: 1 | Status: SHIPPED | OUTPATIENT
Start: 2022-08-03 | End: 2023-03-31

## 2022-08-03 NOTE — PROGRESS NOTES
" Erick Antonio is a 71 y.o. male who presents for  evaluation of   Diabetes                                        This was a Telehealth Encounter. Benefits and Disadvantages of a Telehealth Visit were discussed and accepted by patient. .  Patient agreed to receive service through Telehealth visit as patient is being compliant with social distancing recommendations imparted by CDC.     You have chosen to receive care through a telehealth visit.  Do you consent to use a video/audio connection for your medical care today? Yes        Primary Care / Referring Provider  Willis Green MD    71 y.o.      T2 DM  constant   relatively well controlled but drinking Dr. Pepper  severity moderate  Has cad, no hf       Had MI  He can't tolerate statins.  Now on repatha and LDL reduced but not at goal         Review of Systems  None      Objective:     /78   Pulse 70   Ht 177.8 cm (70\")   Wt 80.7 kg (178 lb)   SpO2 97%   BMI 25.54 kg/m²     Physical Exam   HENT:   Head: Normocephalic.   Musculoskeletal:      Right lower leg: No edema.      Left lower leg: No edema.   Neurological: He is alert.       Lab Review            Assessment & Plan       ICD-10-CM ICD-9-CM   1. Type 2 diabetes mellitus with hyperglycemia, without long-term current use of insulin ([facility])  E11.65 250.00     790.29   2. Hypertension associated with diabetes ([facility])  E11.59 250.80    I15.2 401.9   3. Mixed diabetic hyperlipidemia associated with type 2 diabetes mellitus ([facility])  E11.69 250.80    E78.2 272.2   4. Subclinical hypothyroidism  E03.8 244.8   5. Chronic kidney disease (CKD) stage G3a/A1, moderately decreased glomerular filtration rate (GFR) between 45-59 mL/min/1.73 square meter and albuminuria creatinine ratio less than 30 mg/g (CMS/H* ([facility])  N18.31 585.3   6. Coronary artery disease involving native coronary artery of native heart without angina pectoris  I25.10 414.01   7. Diabetic polyneuropathy associated with type 2 diabetes " mellitus (HCC)  E11.42 250.60     357.2       Glycemic Management:   t2dm w CAD and nephropathy      Lab Results   Component Value Date    HGBA1C 7.9 07/27/2022    HGBA1C 7.4 07/27/2021    HGBA1C 7.5 04/01/2021     Lab Results   Component Value Date    CREATININE 1.15 04/10/2017       Stop Dr. Pepper     Metformin 500 mg tablets once a day - stop since diarrhea , we may consider in the future     Januvia 100 mg before breakfast  ozempic resulted in side effects      Jardiance 10 mg before bkfast  Change to farxiga 10 mg daily justified by renal data that jardiance lacks   GFR 59- 60 - 57 - GFR      actos 15 mg daily       Neuropathy - neurontin 100 mg qhs  Partially effective    Increase up to 200 mg qhs   ----------------------------------------------------------      Lipid Management    Was on  lipitor 80 mg qhs and zetia 10 mg daily that couldn't tolerata    On repatha and      Add crestor 5 mg weekly - now 107 -- 80s   Twice weekly            Blood Pressure Management  Vitals:    08/03/22 0951   BP: 140/78   Pulse: 70   SpO2: 97%       atenolol 25 mg tablet, 1 tablet(s) by mouth daily - stopped    Now on losartan 25 mg daily      Preventive Care:  Patient is not smoking     Bone Health  12-14 nl DXA         vit D 50 th u q 1 w  , now 5 th units daily        Mild elevation of calcium , monitor trend , persistent at 10.4 but corrected for albumin of 4.0 his calcium is 9.9     Other Diabetes Related Aspects     Nl TSH and B12     --    Hypogonadism     Has bph controlled on flomax     Was on IM testosterone but  stopped due to MI      subclinical hypothyroidism    Last TSH 4.56 , this time 3.29     No need to treat     Orders Placed This Encounter   Procedures   • Hemoglobin A1c     This order was created through External Result Entry     Order Specific Question:   Release to patient     Answer:   Immediate   • LDL Cholesterol (Direct)     This order was created through External Result Entry     Order  Specific Question:   Release to patient     Answer:   Immediate   • CBC Auto Differential     Standing Status:   Future     Standing Expiration Date:   8/3/2023     Order Specific Question:   Release to patient     Answer:   Immediate   • Comprehensive Metabolic Panel     Standing Status:   Future     Standing Expiration Date:   8/3/2023     Order Specific Question:   Release to patient     Answer:   Immediate   • Hemoglobin A1c     Standing Status:   Future     Standing Expiration Date:   8/3/2023     Order Specific Question:   Release to patient     Answer:   Immediate   • Lipid Panel     Standing Status:   Future     Standing Expiration Date:   8/3/2023   • Microalbumin / Creatinine Urine Ratio - Urine, Clean Catch     Standing Status:   Future     Standing Expiration Date:   8/3/2023     Order Specific Question:   Release to patient     Answer:   Immediate   • Vitamin B12     Standing Status:   Future     Standing Expiration Date:   8/3/2023     Order Specific Question:   Release to patient     Answer:   Immediate   • TSH     Standing Status:   Future     Standing Expiration Date:   8/3/2023     Order Specific Question:   Release to patient     Answer:   Immediate         A copy of my note was sent to Willis Green MD    Please see my above opinion and suggestions.

## 2022-10-19 RX ORDER — SITAGLIPTIN 100 MG/1
TABLET, FILM COATED ORAL
Qty: 90 TABLET | Refills: 0 | Status: SHIPPED | OUTPATIENT
Start: 2022-10-19

## 2022-11-07 RX ORDER — PIOGLITAZONEHYDROCHLORIDE 15 MG/1
TABLET ORAL
Qty: 90 TABLET | Refills: 0 | Status: SHIPPED | OUTPATIENT
Start: 2022-11-07

## 2023-01-03 ENCOUNTER — TELEPHONE (OUTPATIENT)
Dept: ENDOCRINOLOGY | Facility: CLINIC | Age: 73
End: 2023-01-03
Payer: COMMERCIAL

## 2023-01-03 NOTE — TELEPHONE ENCOUNTER
Pt called wanting his lab orders sent to him personally so he can take them to [facility] and have them done before his appt with Dr Vazquez in February.    His home address is:  [address] [place] [address]    If this method isn't feasible please advise. I see that the orders have been sent to a  lab, but he wants them done at [facility].    Pt callback number [phone]

## 2023-02-01 ENCOUNTER — OFFICE VISIT (OUTPATIENT)
Dept: ENDOCRINOLOGY | Facility: CLINIC | Age: 73
End: 2023-02-01
Payer: COMMERCIAL

## 2023-02-01 VITALS
SYSTOLIC BLOOD PRESSURE: 110 MMHG | DIASTOLIC BLOOD PRESSURE: 80 MMHG | WEIGHT: 178 LBS | HEIGHT: 70 IN | BODY MASS INDEX: 25.48 KG/M2 | OXYGEN SATURATION: 98 % | HEART RATE: 75 BPM

## 2023-02-01 DIAGNOSIS — I15.2 HYPERTENSION ASSOCIATED WITH DIABETES: ICD-10-CM

## 2023-02-01 DIAGNOSIS — E11.69 MIXED DIABETIC HYPERLIPIDEMIA ASSOCIATED WITH TYPE 2 DIABETES MELLITUS: ICD-10-CM

## 2023-02-01 DIAGNOSIS — E78.2 MIXED DIABETIC HYPERLIPIDEMIA ASSOCIATED WITH TYPE 2 DIABETES MELLITUS: ICD-10-CM

## 2023-02-01 DIAGNOSIS — E11.59 HYPERTENSION ASSOCIATED WITH DIABETES: ICD-10-CM

## 2023-02-01 DIAGNOSIS — E11.42 DIABETIC POLYNEUROPATHY ASSOCIATED WITH TYPE 2 DIABETES MELLITUS: ICD-10-CM

## 2023-02-01 DIAGNOSIS — I20.8 ANGINA OF EFFORT: ICD-10-CM

## 2023-02-01 DIAGNOSIS — E11.65 TYPE 2 DIABETES MELLITUS WITH HYPERGLYCEMIA, WITHOUT LONG-TERM CURRENT USE OF INSULIN: Primary | ICD-10-CM

## 2023-02-01 PROCEDURE — 99214 OFFICE O/P EST MOD 30 MIN: CPT | Performed by: INTERNAL MEDICINE

## 2023-02-01 RX ORDER — INSULIN LISPRO 100 [IU]/ML
INJECTION, SOLUTION INTRAVENOUS; SUBCUTANEOUS
Qty: 6 ML | Refills: 11 | Status: SHIPPED | OUTPATIENT
Start: 2023-02-01

## 2023-02-01 RX ORDER — ISOSORBIDE MONONITRATE 30 MG/1
30 TABLET, EXTENDED RELEASE ORAL DAILY
COMMUNITY

## 2023-02-01 RX ORDER — NITROGLYCERIN 0.4 MG/1
0.4 TABLET SUBLINGUAL
Qty: 30 TABLET | Refills: 11 | Status: SHIPPED | OUTPATIENT
Start: 2023-02-01

## 2023-02-01 RX ORDER — PEN NEEDLE, DIABETIC 30 GX3/16"
1 NEEDLE, DISPOSABLE MISCELLANEOUS 4 TIMES DAILY
Qty: 120 EACH | Refills: 11 | Status: SHIPPED | OUTPATIENT
Start: 2023-02-01

## 2023-02-01 NOTE — PROGRESS NOTES
" Erick Antonio is a 72 y.o. male who presents for  evaluation of   Diabetes                    Primary Care / Referring Provider  Willis Green MD    72 y.o.      T2 DM  constant   relatively well controlled but drinking Dr. Pepper  severity moderate  Has cad, no hf       Had MI  He can't tolerate statins.  Now on repatha and LDL reduced but not at goal         Review of Systems  None      Objective:     /80   Pulse 75   Ht 177.8 cm (70\")   Wt 80.7 kg (178 lb)   SpO2 98%   BMI 25.54 kg/m²     Physical Exam   HENT:   Head: Normocephalic.   Musculoskeletal:      Right lower leg: No edema.      Left lower leg: No edema.   Neurological: He is alert.       Lab Review            Assessment & Plan      Diagnosis Plan   1. Type 2 diabetes mellitus with hyperglycemia, without long-term current use of insulin (HCC)  Insulin Lispro, 1 Unit Dial, (HumaLOG KwikPen) 100 UNIT/ML solution pen-injector    Insulin Pen Needle (Pen Needles) 32G X 4 MM misc      2. Hypertension associated with diabetes ([facility])        3. Mixed diabetic hyperlipidemia associated with type 2 diabetes mellitus ([facility])        4. Diabetic polyneuropathy associated with type 2 diabetes mellitus ([facility])        5. Angina of effort ([facility])  nitroglycerin (NITROSTAT) 0.4 MG SL tablet          Glycemic Management:   t2dm w CAD and nephropathy      Lab Results   Component Value Date    HGBA1C 7.9 07/27/2022    HGBA1C 7.4 07/27/2021    HGBA1C 7.5 04/01/2021     Lab Results   Component Value Date    CREATININE 1.15 04/10/2017       Stop Dr. Pepper     Metformin 500 mg tablets once a day - stop since diarrhea , we may consider in the future     Januvia 100 mg before breakfast  ozempic resulted in side effects      farxiga 10 mg daily   GFR 59- 60 - 57 - 67 GFR      actos 15 mg daily        Start fast acting insulin 2 to 6 units prn for glucose above 180       Neuropathy - neurontin 100 mg qhs  Partially effective    Increase up to 200 mg qhs "   ----------------------------------------------------------      Lipid Management    Was on  lipitor 80 mg qhs and zetia 10 mg daily that couldn't tolerata    couldn't tolerate even crestor once weekly     On repatha         Jan 18, 2023    Total Chll 221    LDL 70 s ,         Blood Pressure Management  Vitals:    02/01/23 0916   BP: 110/80   Pulse: 75   SpO2: 98%       atenolol 25 mg tablet, 1 tablet(s) by mouth daily - stopped    Now on losartan 25 mg daily     Add NTG back , has angina w increased exertion , sees jud      Preventive Care:  Patient is not smoking     Bone Health  12-14 nl DXA         vit D 50 th u q 1 w  , now 5 th units daily        Mild elevation of calcium , monitor trend , persistent at 10.4 but corrected for albumin of 4.0 his calcium is 9.9     Other Diabetes Related Aspects     Nl TSH and B12     --    Hypogonadism     Has bph controlled on flomax     Was on IM testosterone but  stopped due to MI      subclinical hypothyroidism    Last TSH 4.56 , this time 3.29     No need to treat               This document has been electronically signed by Lopez Rojo MD on February 1, 2023 10:29 CST

## 2023-02-09 ENCOUNTER — TELEPHONE (OUTPATIENT)
Dept: ENDOCRINOLOGY | Facility: CLINIC | Age: 73
End: 2023-02-09
Payer: COMMERCIAL

## 2023-02-09 NOTE — TELEPHONE ENCOUNTER
Pt reports FBG this morning 191 (but doesn't usually check); FBG during day usually 116-193. Pt reports he is taking SSI 2-6 units after meals. Advised pt to take SSI prior to meals prn. Pt will call back next week with more BG numbers for possible insulin adjustments.

## 2023-02-09 NOTE — TELEPHONE ENCOUNTER
Patient called and has questions about his shots Dr. Vazquez Prescribed and also is asking about a stronger night time pill because his sugar really high in the morning.    Phone [phone]    Thank you

## 2023-03-30 DIAGNOSIS — E11.42 DIABETIC POLYNEUROPATHY ASSOCIATED WITH TYPE 2 DIABETES MELLITUS: ICD-10-CM

## 2023-03-31 ENCOUNTER — TELEPHONE (OUTPATIENT)
Dept: ENDOCRINOLOGY | Facility: CLINIC | Age: 73
End: 2023-03-31
Payer: COMMERCIAL

## 2023-03-31 RX ORDER — GABAPENTIN 100 MG/1
CAPSULE ORAL
Qty: 180 CAPSULE | Refills: 0 | Status: SHIPPED | OUTPATIENT
Start: 2023-03-31

## 2023-04-24 RX ORDER — SITAGLIPTIN 100 MG/1
TABLET, FILM COATED ORAL
Qty: 90 TABLET | Refills: 0 | Status: SHIPPED | OUTPATIENT
Start: 2023-04-24

## 2023-05-08 RX ORDER — PIOGLITAZONEHYDROCHLORIDE 15 MG/1
TABLET ORAL
Qty: 90 TABLET | Refills: 0 | Status: SHIPPED | OUTPATIENT
Start: 2023-05-08

## 2023-05-26 ENCOUNTER — TELEPHONE (OUTPATIENT)
Dept: ENDOCRINOLOGY | Facility: CLINIC | Age: 73
End: 2023-05-26
Payer: COMMERCIAL

## 2023-05-26 NOTE — TELEPHONE ENCOUNTER
Pt called, requesting his lab orders to be sent to him via snail mail, so he can get them done at his doctor's office. Home address was verified over the phone.    Pt callback number [phone]

## 2023-06-02 RX ORDER — EVOLOCUMAB 140 MG/ML
INJECTION, SOLUTION SUBCUTANEOUS
Qty: 2 ML | Refills: 3 | Status: SHIPPED | OUTPATIENT
Start: 2023-06-02

## 2023-07-27 ENCOUNTER — TELEPHONE (OUTPATIENT)
Dept: ENDOCRINOLOGY | Facility: CLINIC | Age: 73
End: 2023-07-27

## 2023-07-27 NOTE — TELEPHONE ENCOUNTER
Patient called and stated that the pharmacy said something needs to be sent in to the insurance company for him to get his Repatha.

## 2023-08-04 ENCOUNTER — DOCUMENTATION (OUTPATIENT)
Dept: ENDOCRINOLOGY | Facility: CLINIC | Age: 73
End: 2023-08-04
Payer: COMMERCIAL

## 2023-08-07 ENCOUNTER — DOCUMENTATION (OUTPATIENT)
Dept: ENDOCRINOLOGY | Facility: CLINIC | Age: 73
End: 2023-08-07
Payer: COMMERCIAL

## 2023-09-12 ENCOUNTER — TELEPHONE (OUTPATIENT)
Dept: ENDOCRINOLOGY | Facility: CLINIC | Age: 73
End: 2023-09-12
Payer: COMMERCIAL

## 2023-09-12 NOTE — TELEPHONE ENCOUNTER
Called and advised Erick that we do not have his BIN number as it is part of his pharmacy benefits. He stated that he will get the number and call the company.

## 2023-09-12 NOTE — TELEPHONE ENCOUNTER
Pt called stating that he was only having to pay a $5.00 copay for his Repatha shots, but they have now switched insurance from [employer] to [employer] and now they are trying to charge him $60.00 copay for the Repatha. He said they told him that he should have been sent a card to get the $5.00 copay when he switched insurance, but he said he never got any type of card and has never used one before. He said he spoke with rudy and they are needing a SHANTANU # for him to continue to only have a $5.00 copay and said Mathew is needing our office to call them at [phone]. Pt said this has been going on for a few days now and he is behind on getting his shot and it is starting to affect his blood sugar.    Please advise.    Thanks

## 2023-09-14 ENCOUNTER — DOCUMENTATION (OUTPATIENT)
Dept: ENDOCRINOLOGY | Facility: CLINIC | Age: 73
End: 2023-09-14
Payer: COMMERCIAL

## 2023-09-14 NOTE — PROGRESS NOTES
FARXIGA 5 MG PA HAS BEEN CLOSED BECAUSE IT DOES NOT REQUIRE A PA AT THIS TIME.    SENT TO MED REC

## 2023-09-19 ENCOUNTER — DOCUMENTATION (OUTPATIENT)
Dept: ENDOCRINOLOGY | Facility: CLINIC | Age: 73
End: 2023-09-19
Payer: COMMERCIAL

## 2023-09-21 ENCOUNTER — TELEPHONE (OUTPATIENT)
Dept: ENDOCRINOLOGY | Facility: CLINIC | Age: 73
End: 2023-09-21

## 2023-09-21 NOTE — TELEPHONE ENCOUNTER
Patient left a VM wanting to speak to Dr dietrich nurse in regards to his Rapatha shots.   Please advise.     Thank you

## (undated) DEVICE — GW PRESSUREWIRE AERIS W/AGILE TP 300CM

## (undated) DEVICE — MODEL AT P65, P/N 701554-001KIT CONTENTS: HAND CONTROLLER, 3-WAY HIGH-PRESSURE STOPCOCK WITH ROTATING END AND PREMIUM HIGH-PRESSURE TUBING: Brand: ANGIOTOUCH® KIT

## (undated) DEVICE — TREK CORONARY DILATATION CATHETER 2.50 MM X 15 MM / OVER-THE-WIRE: Brand: TREK

## (undated) DEVICE — MODEL BT2000 P/N 700287-012KIT CONTENTS: MANIFOLD WITH SALINE AND CONTRAST PORTS, SALINE TUBING WITH SPIKE AND HAND SYRINGE, TRANSDUCER: Brand: BT2000 AUTOMATED MANIFOLD KIT

## (undated) DEVICE — COPILOT KIT INCLUDES BLEEDBACK CONTROL VALVE / GUIDE WIRE INTRODUCER / TORQUE DEVICE: Brand: ACCESSORIES

## (undated) DEVICE — CATH DIAG EXPO M/ PK 6FR FL4/FR4 PIG 3PK

## (undated) DEVICE — GW CHOICE PT J 300CM

## (undated) DEVICE — GW PERIPH GUIDERIGHT STD/J/TP PTFE/PCOAT SS 0.038IN 5X150CM

## (undated) DEVICE — KT INTRO MINISTICK MAX W/GW NITNL/TUNG ECHO 4F 21G 7CM

## (undated) DEVICE — A2000 MULTI-USE SYRINGE KIT, P/N 701277-003KIT CONTENTS: 100ML CONTRAST RESERVOIR AND TUBING WITH CONTRAST SPIKE AND CLAMP: Brand: A2000 MULTI-USE SYRINGE KIT

## (undated) DEVICE — INTRO SHEATH ART/FEM ENGAGE .038 6F12CM

## (undated) DEVICE — MINI TREK™ II CORONARY DILATATION CATHETER 2.00 MM X 12 MM / OVER-THE-WIRE: Brand: MINI TREK™

## (undated) DEVICE — PK CATH LAB 60

## (undated) DEVICE — ANGIO-SEAL EVOLUTION VASCULAR CLOSURE DEVICE: Brand: ANGIO-SEAL

## (undated) DEVICE — CATH GUIDE LAUNCHER JL4.0 6F 100CM

## (undated) DEVICE — ELECTRODE,RT,STRESS,FOAM,50PK: Brand: MEDLINE

## (undated) DEVICE — DEV INFL MONARCH 20ML

## (undated) DEVICE — SYRINGE KIT,PACKAGED,,150FT,MK 7(ANGIO-ARTERION, 150ML SYR KIT W/QFT,MC)(60729385): Brand: MEDRAD® MARK 7 ARTERION DISPOSABLE SYRINGE 150 ML WITH QUICK FILL TUBE

## (undated) DEVICE — VSI MICRO-INTRODUCER KITS ARE INTENDED FOR USE IN PERCUTANEOUS INTRODUCTION OF UP TO A 0.018 INCH OR 0.038 INCH GUIDEWIRE OR CATHETER INTO THE VASCULAR SYSTEM FOLLOWING A SMALL GAUGE NEEDLE STICK.: Brand: VSI MICRO-INTRODUCER KIT

## (undated) DEVICE — MYNXGRIP 6F/7F: Brand: MYNXGRIP

## (undated) DEVICE — NC TREK CORONARY DILATATION CATHETER 2.50 MM X 12 MM / OVER-THE-WIRE: Brand: NC TREK